# Patient Record
Sex: MALE | Race: WHITE | NOT HISPANIC OR LATINO | Employment: PART TIME | URBAN - NONMETROPOLITAN AREA
[De-identification: names, ages, dates, MRNs, and addresses within clinical notes are randomized per-mention and may not be internally consistent; named-entity substitution may affect disease eponyms.]

---

## 2018-01-22 ENCOUNTER — OFFICE VISIT (OUTPATIENT)
Dept: URGENT CARE | Facility: PHYSICIAN GROUP | Age: 63
End: 2018-01-22
Payer: COMMERCIAL

## 2018-01-22 VITALS
OXYGEN SATURATION: 96 % | HEIGHT: 64 IN | DIASTOLIC BLOOD PRESSURE: 68 MMHG | HEART RATE: 96 BPM | SYSTOLIC BLOOD PRESSURE: 124 MMHG | WEIGHT: 209 LBS | BODY MASS INDEX: 35.68 KG/M2 | RESPIRATION RATE: 16 BRPM | TEMPERATURE: 98.3 F

## 2018-01-22 DIAGNOSIS — N40.0 ENLARGED PROSTATE: Primary | ICD-10-CM

## 2018-01-22 DIAGNOSIS — E66.9 OBESITY (BMI 30-39.9): ICD-10-CM

## 2018-01-22 PROCEDURE — 99204 OFFICE O/P NEW MOD 45 MIN: CPT | Performed by: PHYSICIAN ASSISTANT

## 2018-01-22 RX ORDER — ASPIRIN 81 MG/1
81 TABLET, CHEWABLE ORAL DAILY
COMMUNITY

## 2018-01-22 RX ORDER — CHLORAL HYDRATE 500 MG
1000 CAPSULE ORAL
COMMUNITY
End: 2023-06-15

## 2018-01-22 RX ORDER — SODIUM PHOSPHATE,MONO-DIBASIC 19G-7G/118
500 ENEMA (ML) RECTAL DAILY
COMMUNITY

## 2018-01-22 RX ORDER — ATORVASTATIN CALCIUM 20 MG/1
20 TABLET, FILM COATED ORAL NIGHTLY
COMMUNITY
End: 2018-02-27 | Stop reason: SDUPTHER

## 2018-01-22 RX ORDER — TAMSULOSIN HYDROCHLORIDE 0.4 MG/1
0.4 CAPSULE ORAL
Qty: 30 CAP | Refills: 0 | Status: SHIPPED | OUTPATIENT
Start: 2018-01-22 | End: 2018-02-19 | Stop reason: SDUPTHER

## 2018-01-23 ENCOUNTER — HOSPITAL ENCOUNTER (OUTPATIENT)
Dept: LAB | Facility: MEDICAL CENTER | Age: 63
End: 2018-01-23
Attending: PHYSICIAN ASSISTANT
Payer: COMMERCIAL

## 2018-01-23 LAB
ALBUMIN SERPL BCP-MCNC: 4.6 G/DL (ref 3.2–4.9)
ALBUMIN/GLOB SERPL: 1.8 G/DL
ALP SERPL-CCNC: 38 U/L (ref 30–99)
ALT SERPL-CCNC: 40 U/L (ref 2–50)
ANION GAP SERPL CALC-SCNC: 8 MMOL/L (ref 0–11.9)
AST SERPL-CCNC: 26 U/L (ref 12–45)
BASOPHILS # BLD AUTO: 0.4 % (ref 0–1.8)
BASOPHILS # BLD: 0.02 K/UL (ref 0–0.12)
BILIRUB SERPL-MCNC: 0.9 MG/DL (ref 0.1–1.5)
BUN SERPL-MCNC: 22 MG/DL (ref 8–22)
CALCIUM SERPL-MCNC: 9 MG/DL (ref 8.5–10.5)
CHLORIDE SERPL-SCNC: 105 MMOL/L (ref 96–112)
CHOLEST SERPL-MCNC: 173 MG/DL (ref 100–199)
CO2 SERPL-SCNC: 26 MMOL/L (ref 20–33)
CREAT SERPL-MCNC: 0.76 MG/DL (ref 0.5–1.4)
EOSINOPHIL # BLD AUTO: 0.09 K/UL (ref 0–0.51)
EOSINOPHIL NFR BLD: 1.6 % (ref 0–6.9)
ERYTHROCYTE [DISTWIDTH] IN BLOOD BY AUTOMATED COUNT: 44.4 FL (ref 35.9–50)
GLOBULIN SER CALC-MCNC: 2.5 G/DL (ref 1.9–3.5)
GLUCOSE SERPL-MCNC: 94 MG/DL (ref 65–99)
HCT VFR BLD AUTO: 48.8 % (ref 42–52)
HDLC SERPL-MCNC: 40 MG/DL
HGB BLD-MCNC: 15.8 G/DL (ref 14–18)
IMM GRANULOCYTES # BLD AUTO: 0.01 K/UL (ref 0–0.11)
IMM GRANULOCYTES NFR BLD AUTO: 0.2 % (ref 0–0.9)
LDLC SERPL CALC-MCNC: 79 MG/DL
LYMPHOCYTES # BLD AUTO: 1.14 K/UL (ref 1–4.8)
LYMPHOCYTES NFR BLD: 20.5 % (ref 22–41)
MCH RBC QN AUTO: 29.6 PG (ref 27–33)
MCHC RBC AUTO-ENTMCNC: 32.4 G/DL (ref 33.7–35.3)
MCV RBC AUTO: 91.4 FL (ref 81.4–97.8)
MONOCYTES # BLD AUTO: 0.5 K/UL (ref 0–0.85)
MONOCYTES NFR BLD AUTO: 9 % (ref 0–13.4)
NEUTROPHILS # BLD AUTO: 3.79 K/UL (ref 1.82–7.42)
NEUTROPHILS NFR BLD: 68.3 % (ref 44–72)
NRBC # BLD AUTO: 0 K/UL
NRBC BLD-RTO: 0 /100 WBC
PLATELET # BLD AUTO: 231 K/UL (ref 164–446)
PMV BLD AUTO: 10.7 FL (ref 9–12.9)
POTASSIUM SERPL-SCNC: 4.1 MMOL/L (ref 3.6–5.5)
PROT SERPL-MCNC: 7.1 G/DL (ref 6–8.2)
PSA SERPL-MCNC: 0.99 NG/ML (ref 0–4)
RBC # BLD AUTO: 5.34 M/UL (ref 4.7–6.1)
SODIUM SERPL-SCNC: 139 MMOL/L (ref 135–145)
TRIGL SERPL-MCNC: 269 MG/DL (ref 0–149)
TSH SERPL DL<=0.005 MIU/L-ACNC: 1.93 UIU/ML (ref 0.38–5.33)
WBC # BLD AUTO: 5.6 K/UL (ref 4.8–10.8)

## 2018-01-23 PROCEDURE — 80061 LIPID PANEL: CPT

## 2018-01-23 PROCEDURE — 84153 ASSAY OF PSA TOTAL: CPT

## 2018-01-23 PROCEDURE — 80053 COMPREHEN METABOLIC PANEL: CPT

## 2018-01-23 PROCEDURE — 36415 COLL VENOUS BLD VENIPUNCTURE: CPT

## 2018-01-23 PROCEDURE — 84443 ASSAY THYROID STIM HORMONE: CPT

## 2018-01-23 PROCEDURE — 85025 COMPLETE CBC W/AUTO DIFF WBC: CPT

## 2018-01-23 NOTE — PROGRESS NOTES
Chief Complaint   Patient presents with   • Other     Pain/Pressure; x1 month       HISTORY OF PRESENT ILLNESS: Patient is a 62 y.o. male who presents today because he has a 4-6 week history of a fullness sensation in his perineum. No urinary symptoms, but does state that he does not urinate with the force of the teenager. No bowel or bladder changes, no bleeding rectally. He has not been taking any medication for symptoms    Patient Active Problem List    Diagnosis Date Noted   • Obesity (BMI 30-39.9) 01/22/2018       Allergies:Pcn [penicillins]    Current Outpatient Prescriptions Ordered in [facility]   Medication Sig Dispense Refill   • atorvastatin (LIPITOR) 20 MG Tab Take 20 mg by mouth every evening.     • aspirin (ASA) 81 MG Chew Tab chewable tablet Take 81 mg by mouth every day.     • glucosamine Sulfate 500 MG Cap Take 500 mg by mouth 3 times a day, with meals.     • Omega-3 Fatty Acids (FISH OIL) 1000 MG Cap capsule Take 1,000 mg by mouth 3 times a day, with meals.     • tamsulosin (FLOMAX) 0.4 MG capsule Take 1 Cap by mouth ONE-HALF HOUR AFTER BREAKFAST. 30 Cap 0     No current Epic-ordered facility-administered medications on file.        History reviewed. No pertinent past medical history.    Social History   Substance Use Topics   • Smoking status: Never Smoker   • Smokeless tobacco: Never Used   • Alcohol use Yes      Comment: 1 beer per day       No family status information on file.   History reviewed. No pertinent family history.    ROS:  Review of Systems   Constitutional: Negative for fever, chills, weight loss and malaise/fatigue.   HENT: Negative for ear pain, nosebleeds, congestion, sore throat and neck pain.    Eyes: Negative for blurred vision.   Respiratory: Negative for cough, sputum production, shortness of breath and wheezing.    Cardiovascular: Negative for chest pain, palpitations, orthopnea and leg swelling.   Gastrointestinal: Negative for heartburn, nausea, vomiting and abdominal pain.  "  Genitourinary: Negative for dysuria, urgency and frequency.     Exam:  Blood pressure 124/68, pulse 96, temperature 36.8 °C (98.3 °F), resp. rate 16, height 1.626 m (5' 4\"), weight 94.8 kg (209 lb), SpO2 96 %.  General:  Well nourished, well developed male in NAD  Head:Normocephalic, atraumatic  Eyes: PERRLA, EOM within normal limits, no conjunctival injection, no scleral icterus, visual fields and acuity grossly intact.  Extremities: no clubbing, cyanosis, or edema.  Digital rectal exam: He has a small, flesh-colored, old external hemorrhoid in the 5:00 position, no tenderness, and no induration. Prostate is smooth, enlarged but nontender.    Please note that this dictation was created using voice recognition software. I have made every reasonable attempt to correct obvious errors, but I expect that there are errors of grammar and possibly content that I did not discover before finalizing the note.    Assessment/Plan:  1. Enlarged prostate  CBC WITH DIFFERENTIAL    LIPID PANEL    TSH    PROSTATE SPECIFIC AG    COMP METABOLIC PANEL    tamsulosin (FLOMAX) 0.4 MG capsule   2. Obesity (BMI 30-39.9)  Patient identified as having weight management issue.  Appropriate orders and counseling given.       Followup with primary care in the next 7-10 days if not significantly improving, return to the urgent care or go to the emergency room sooner for any worsening of symptoms.       "

## 2018-01-29 ENCOUNTER — TELEPHONE (OUTPATIENT)
Dept: URGENT CARE | Facility: PHYSICIAN GROUP | Age: 63
End: 2018-01-29

## 2018-01-29 NOTE — TELEPHONE ENCOUNTER
----- Message from Rohit France P.A.-C. sent at 1/24/2018 12:49 PM PST -----  Please notify patient that there are no concerning abnormalities on labs.  Follow up with Dr Rose as scheduled

## 2018-02-19 DIAGNOSIS — N40.0 ENLARGED PROSTATE: ICD-10-CM

## 2018-02-19 RX ORDER — TAMSULOSIN HYDROCHLORIDE 0.4 MG/1
0.4 CAPSULE ORAL
Qty: 14 CAP | Refills: 0 | Status: SHIPPED | OUTPATIENT
Start: 2018-02-19 | End: 2018-02-27 | Stop reason: SDUPTHER

## 2018-02-27 ENCOUNTER — OFFICE VISIT (OUTPATIENT)
Dept: MEDICAL GROUP | Facility: PHYSICIAN GROUP | Age: 63
End: 2018-02-27
Payer: COMMERCIAL

## 2018-02-27 VITALS
RESPIRATION RATE: 16 BRPM | SYSTOLIC BLOOD PRESSURE: 118 MMHG | WEIGHT: 180.6 LBS | BODY MASS INDEX: 29.02 KG/M2 | TEMPERATURE: 98 F | HEART RATE: 79 BPM | OXYGEN SATURATION: 97 % | DIASTOLIC BLOOD PRESSURE: 62 MMHG | HEIGHT: 66 IN

## 2018-02-27 DIAGNOSIS — E78.5 DYSLIPIDEMIA: ICD-10-CM

## 2018-02-27 DIAGNOSIS — N40.0 BENIGN PROSTATIC HYPERPLASIA WITHOUT LOWER URINARY TRACT SYMPTOMS: ICD-10-CM

## 2018-02-27 DIAGNOSIS — M62.838 MUSCLE SPASMS OF NECK: ICD-10-CM

## 2018-02-27 DIAGNOSIS — Z96.643 HISTORY OF TOTAL REPLACEMENT OF BOTH HIP JOINTS: ICD-10-CM

## 2018-02-27 DIAGNOSIS — Z98.1 S/P CERVICAL SPINAL FUSION: ICD-10-CM

## 2018-02-27 DIAGNOSIS — M62.838 MUSCLE SPASMS OF BOTH LOWER EXTREMITIES: ICD-10-CM

## 2018-02-27 DIAGNOSIS — N40.0 ENLARGED PROSTATE: ICD-10-CM

## 2018-02-27 DIAGNOSIS — G47.33 OSA ON CPAP: ICD-10-CM

## 2018-02-27 PROCEDURE — 99204 OFFICE O/P NEW MOD 45 MIN: CPT | Performed by: FAMILY MEDICINE

## 2018-02-27 RX ORDER — TAMSULOSIN HYDROCHLORIDE 0.4 MG/1
0.4 CAPSULE ORAL
Qty: 90 CAP | Refills: 3 | Status: SHIPPED | OUTPATIENT
Start: 2018-02-27 | End: 2019-04-08 | Stop reason: SDUPTHER

## 2018-02-27 RX ORDER — TIZANIDINE 4 MG/1
4 TABLET ORAL EVERY 6 HOURS PRN
Qty: 30 TAB | Refills: 3 | Status: SHIPPED | OUTPATIENT
Start: 2018-02-27 | End: 2020-10-20

## 2018-02-27 RX ORDER — ATORVASTATIN CALCIUM 20 MG/1
20 TABLET, FILM COATED ORAL DAILY
Qty: 90 TAB | Refills: 3 | Status: SHIPPED | OUTPATIENT
Start: 2018-02-27 | End: 2019-04-08 | Stop reason: SDUPTHER

## 2018-02-27 NOTE — ASSESSMENT & PLAN NOTE
Had normal PSA screening done. Last April 2016 was 0.5  He notes he has pelvic pain which has improved with the flomax but not resolved.

## 2018-02-27 NOTE — ASSESSMENT & PLAN NOTE
He had bilateral hip replacements done in the past with revision done. He was 15 years in a WC and crutches before his hip replacements now he can walk again!!  Hereditary multiple osteochondromatosis

## 2018-02-27 NOTE — ASSESSMENT & PLAN NOTE
Continues on atorvastatin and fish oil capsules.      Ref. Range 1/23/2018 07:25   Cholesterol,Tot Latest Ref Range: 100 - 199 mg/dL 173   Triglycerides Latest Ref Range: 0 - 149 mg/dL 269 (H)   HDL Latest Ref Range: >=40 mg/dL 40   LDL Latest Ref Range: <100 mg/dL 79

## 2018-02-27 NOTE — PROGRESS NOTES
Subjective:   Amadeo Randall is a 62 y.o. male here today for evaluation and management of:     Dyslipidemia  Continues on atorvastatin and fish oil capsules.      Ref. Range 1/23/2018 07:25   Cholesterol,Tot Latest Ref Range: 100 - 199 mg/dL 173   Triglycerides Latest Ref Range: 0 - 149 mg/dL 269 (H)   HDL Latest Ref Range: >=40 mg/dL 40   LDL Latest Ref Range: <100 mg/dL 79       Benign prostatic hyperplasia without lower urinary tract symptoms  Had normal PSA screening done. Last April 2016 was 0.5  He notes he has pelvic pain which has improved with the flomax but not resolved.     S/P cervical spinal fusion  C 5-6-7 fusion done Nov 2014  Pain aggravated by stress.   He was on valium and uses 60 pills for the year. He was receiving this from this orthopedic specialist. Advised him to avoid benzos.   Flexeril gives him a hangover.   Will try tizanidine.     History of total replacement of both hip joints  He had bilateral hip replacements done in the past with revision done. He was 15 years in a WC and crutches before his hip replacements now he can walk again!!  Hereditary multiple osteochondromatosis     He had colonoscopies done 2007 and 2014: gets it done every 5 years to follow polyps.     Current medicines (including changes today)  Current Outpatient Prescriptions   Medication Sig Dispense Refill   • tizanidine (ZANAFLEX) 4 MG Tab Take 1 Tab by mouth every 6 hours as needed. 30 Tab 3   • tamsulosin (FLOMAX) 0.4 MG capsule Take 1 Cap by mouth ONE-HALF HOUR AFTER BREAKFAST. 14 Cap 0   • atorvastatin (LIPITOR) 20 MG Tab Take 20 mg by mouth every evening.     • aspirin (ASA) 81 MG Chew Tab chewable tablet Take 81 mg by mouth every day.     • glucosamine Sulfate 500 MG Cap Take 500 mg by mouth every day.     • Omega-3 Fatty Acids (FISH OIL) 1000 MG Cap capsule Take 1,000 mg by mouth 3 times a day, with meals.       No current facility-administered medications for this visit.      He  has no past medical history on  "file.    ROS  No chest pain, no shortness of breath, no abdominal pain       Objective:     Blood pressure 118/62, pulse 79, temperature 36.7 °C (98 °F), resp. rate 16, height 1.67 m (5' 5.75\"), weight 81.9 kg (180 lb 9.6 oz), SpO2 97 %. Body mass index is 29.37 kg/m².   Physical Exam:  Constitutional: Alert, no distress.  Skin: Warm, dry, good turgor, no rashes in visible areas.  Eye: Equal, round and reactive, conjunctiva clear, lids normal.  ENMT: Lips without lesions, good dentition, oropharynx clear.  Neck: Trachea midline, no masses, no thyromegaly. No cervical or supraclavicular lymphadenopathy  Respiratory: Unlabored respiratory effort, lungs clear to auscultation, no wheezes, no ronchi.  Cardiovascular: Normal S1, S2, no murmur, no edema.  Abdomen: Soft, non-tender, no masses, no hepatosplenomegaly.  Psych: Alert and oriented x3, normal affect and mood.        Assessment and Plan:   The following treatment plan was discussed    1. Dyslipidemia  Controlled with atorvastatin, fish oil capsules    2. Benign prostatic hyperplasia without lower urinary tract symptoms  He takes flomax,   Will refer to urology.     3. S/P cervical spinal fusion  Stable.     4. Muscle spasms of both lower extremities  Advised to avoid valium if possible, will try tizanidine, if this does not help with follow controlled substance agreement, check UDS and provide rx for valium.     5. Muscle spasms of neck  - tizanidine (ZANAFLEX) 4 MG Tab; Take 1 Tab by mouth every 6 hours as needed.  Dispense: 30 Tab; Refill: 3    6. History of total replacement of both hip joints  Stable.       Followup: No Follow-up on file.         "

## 2018-02-27 NOTE — ASSESSMENT & PLAN NOTE
C 5-6-7 fusion done Nov 2014  Pain aggravated by stress.   He was on valium and uses 60 pills for the year. He was receiving this from this orthopedic specialist. Advised him to avoid benzos.   Flexeril gives him a hangover.   Will try tizanidine.

## 2018-02-27 NOTE — ASSESSMENT & PLAN NOTE
Has been using cpap without oxygen for 4 years for ciro  At setting 8.0  He was seen by a sleep specialist last year and all was stable.

## 2018-03-02 ENCOUNTER — TELEPHONE (OUTPATIENT)
Dept: MEDICAL GROUP | Facility: PHYSICIAN GROUP | Age: 63
End: 2018-03-02

## 2018-03-05 DIAGNOSIS — N40.1 BENIGN PROSTATIC HYPERPLASIA WITH LOWER URINARY TRACT SYMPTOMS, SYMPTOM DETAILS UNSPECIFIED: ICD-10-CM

## 2018-06-11 ENCOUNTER — OFFICE VISIT (OUTPATIENT)
Dept: MEDICAL GROUP | Facility: PHYSICIAN GROUP | Age: 63
End: 2018-06-11
Payer: COMMERCIAL

## 2018-06-11 VITALS
OXYGEN SATURATION: 98 % | HEART RATE: 81 BPM | WEIGHT: 186 LBS | BODY MASS INDEX: 31.76 KG/M2 | SYSTOLIC BLOOD PRESSURE: 116 MMHG | TEMPERATURE: 97.9 F | RESPIRATION RATE: 16 BRPM | DIASTOLIC BLOOD PRESSURE: 66 MMHG | HEIGHT: 64 IN

## 2018-06-11 DIAGNOSIS — E78.5 DYSLIPIDEMIA: ICD-10-CM

## 2018-06-11 DIAGNOSIS — L98.9 SKIN LESION: ICD-10-CM

## 2018-06-11 DIAGNOSIS — N40.0 BENIGN PROSTATIC HYPERPLASIA WITHOUT LOWER URINARY TRACT SYMPTOMS: ICD-10-CM

## 2018-06-11 PROCEDURE — 17000 DESTRUCT PREMALG LESION: CPT | Performed by: FAMILY MEDICINE

## 2018-06-11 PROCEDURE — 99213 OFFICE O/P EST LOW 20 MIN: CPT | Mod: 25 | Performed by: FAMILY MEDICINE

## 2018-06-11 NOTE — PROGRESS NOTES
"Subjective:   Amadeo Randall is a 62 y.o. male here today for evaluation and management of:     Skin lesion  Right forearm  Small skin lesion  0.5 mm oval, hyperpigmented.   Will treat with cryotherapy as lesion has been changing.     Dyslipidemia  Elevated TG  Advised on diet changes.     Benign prostatic hyperplasia without lower urinary tract symptoms  psa is normal,   Check yearly.          Current medicines (including changes today)  Current Outpatient Prescriptions   Medication Sig Dispense Refill   • tizanidine (ZANAFLEX) 4 MG Tab Take 1 Tab by mouth every 6 hours as needed. 30 Tab 3   • tamsulosin (FLOMAX) 0.4 MG capsule Take 1 Cap by mouth ONE-HALF HOUR AFTER BREAKFAST. 90 Cap 3   • atorvastatin (LIPITOR) 20 MG Tab Take 1 Tab by mouth every day. 90 Tab 3   • aspirin (ASA) 81 MG Chew Tab chewable tablet Take 81 mg by mouth every day.     • glucosamine Sulfate 500 MG Cap Take 500 mg by mouth every day.     • Omega-3 Fatty Acids (FISH OIL) 1000 MG Cap capsule Take 1,000 mg by mouth 3 times a day, with meals.       No current facility-administered medications for this visit.      He  has no past medical history on file.    ROS  No chest pain, no shortness of breath, no abdominal pain       Objective:     Blood pressure 116/66, pulse 81, temperature 36.6 °C (97.9 °F), resp. rate 16, height 1.619 m (5' 3.75\"), weight 84.4 kg (186 lb), SpO2 98 %. Body mass index is 32.18 kg/m².   Physical Exam:  Constitutional: Alert, no distress.  Skin: Warm, dry, good turgor,0.5 mm lesion on right fore arm. Raised, evenly pigmented oval.   Eye: Equal, round and reactive, conjunctiva clear, lids normal.  ENMT: Lips without lesions, good dentition, oropharynx clear.  Neck: Trachea midline, no masses, no thyromegaly. No cervical or supraclavicular lymphadenopathy  Respiratory: Unlabored respiratory effort, lungs clear to auscultation, no wheezes, no ronchi.  Cardiovascular: Normal S1, S2, no murmur, no edema.  Abdomen: Soft, " non-tender, no masses, no hepatosplenomegaly.  Psych: Alert and oriented x3, normal affect and mood.        Assessment and Plan:   The following treatment plan was discussed    1. Skin lesion  Treated with cryotherapy  - REFERRAL TO DERMATOLOGY    2. Dyslipidemia  Encouraged diet changes  Continue on atorvastatin.     3. Benign prostatic hyperplasia without lower urinary tract symptoms  Normal PSA  Continue follow up with urology.       Followup: Return in about 6 months (around 12/11/2018).

## 2018-06-11 NOTE — ASSESSMENT & PLAN NOTE
Right forearm  Small skin lesion  0.5 mm oval, hyperpigmented.   Will treat with cryotherapy as lesion has been changing.

## 2018-12-23 ENCOUNTER — OFFICE VISIT (OUTPATIENT)
Dept: URGENT CARE | Facility: PHYSICIAN GROUP | Age: 63
End: 2018-12-23
Payer: COMMERCIAL

## 2018-12-23 VITALS
BODY MASS INDEX: 31.66 KG/M2 | WEIGHT: 183 LBS | TEMPERATURE: 97.7 F | RESPIRATION RATE: 16 BRPM | DIASTOLIC BLOOD PRESSURE: 78 MMHG | OXYGEN SATURATION: 93 % | SYSTOLIC BLOOD PRESSURE: 128 MMHG | HEART RATE: 68 BPM

## 2018-12-23 DIAGNOSIS — R05.9 COUGH: ICD-10-CM

## 2018-12-23 DIAGNOSIS — J22 LRTI (LOWER RESPIRATORY TRACT INFECTION): ICD-10-CM

## 2018-12-23 PROCEDURE — 99214 OFFICE O/P EST MOD 30 MIN: CPT | Performed by: NURSE PRACTITIONER

## 2018-12-23 RX ORDER — AZITHROMYCIN 250 MG/1
TABLET, FILM COATED ORAL
Qty: 6 TAB | Refills: 0 | Status: SHIPPED | OUTPATIENT
Start: 2018-12-23 | End: 2019-07-10

## 2018-12-23 RX ORDER — DEXTROMETHORPHAN HYDROBROMIDE AND PROMETHAZINE HYDROCHLORIDE 15; 6.25 MG/5ML; MG/5ML
5 SYRUP ORAL EVERY 4 HOURS PRN
Qty: 120 ML | Refills: 0 | Status: SHIPPED | OUTPATIENT
Start: 2018-12-23 | End: 2019-07-10

## 2018-12-23 ASSESSMENT — ENCOUNTER SYMPTOMS
EYE PAIN: 0
COUGH: 1
SORE THROAT: 0
NAUSEA: 0
MYALGIAS: 0
DIZZINESS: 0
SPUTUM PRODUCTION: 1
CHILLS: 1
WHEEZING: 0
SHORTNESS OF BREATH: 0
VOMITING: 0
FEVER: 0

## 2018-12-23 NOTE — PROGRESS NOTES
Subjective:   Amadeo Randall is a 63 y.o. male who presents for Cough        Cough   This is a new problem. The current episode started in the past 7 days. The problem has been gradually worsening. The problem occurs constantly. The cough is productive of sputum. Associated symptoms include chills and nasal congestion. Pertinent negatives include no chest pain, fever, myalgias, postnasal drip, rash, sore throat, shortness of breath or wheezing. Nothing aggravates the symptoms. He has tried OTC cough suppressant for the symptoms. The treatment provided no relief. There is no history of bronchitis.     Review of Systems   Constitutional: Positive for chills and malaise/fatigue. Negative for fever.   HENT: Positive for congestion. Negative for postnasal drip and sore throat.    Eyes: Negative for pain.   Respiratory: Positive for cough and sputum production. Negative for shortness of breath and wheezing.    Cardiovascular: Negative for chest pain.   Gastrointestinal: Negative for nausea and vomiting.   Genitourinary: Negative for hematuria.   Musculoskeletal: Negative for myalgias.   Skin: Negative for rash.   Neurological: Negative for dizziness.     Allergies   Allergen Reactions   • Pcn [Penicillins] Rash     Rash      Objective:   /78   Pulse 68   Temp 36.5 °C (97.7 °F) (Temporal)   Resp 16   Wt 83 kg (183 lb)   SpO2 93%   BMI 31.66 kg/m²   Physical Exam   Constitutional: He is oriented to person, place, and time. He appears well-developed and well-nourished. No distress.   HENT:   Head: Normocephalic and atraumatic.   Right Ear: Tympanic membrane normal.   Left Ear: Tympanic membrane normal.   Nose: Nose normal. Right sinus exhibits no maxillary sinus tenderness and no frontal sinus tenderness. Left sinus exhibits no maxillary sinus tenderness and no frontal sinus tenderness.   Mouth/Throat: Uvula is midline, oropharynx is clear and moist and mucous membranes are normal. No posterior oropharyngeal edema,  posterior oropharyngeal erythema or tonsillar abscesses. No tonsillar exudate.   Eyes: Pupils are equal, round, and reactive to light. Conjunctivae and EOM are normal. Right eye exhibits no discharge. Left eye exhibits no discharge.   Cardiovascular: Normal rate and regular rhythm.    No murmur heard.  Pulmonary/Chest: Effort normal and breath sounds normal. No respiratory distress.   Abdominal: Soft. He exhibits no distension. There is no tenderness.   Lymphadenopathy:     He has no cervical adenopathy.   Neurological: He is alert and oriented to person, place, and time. He has normal reflexes. No sensory deficit.   Skin: Skin is warm, dry and intact.   Psychiatric: He has a normal mood and affect.         Assessment/Plan:   1. LRTI (lower respiratory tract infection)  - azithromycin (ZITHROMAX) 250 MG Tab; Take 2 tablets by mouth on day one. Take one tablet by mouth the remaining days until gone  Dispense: 6 Tab; Refill: 0    2. Cough  - promethazine-dextromethorphan (PROMETHAZINE-DM) 6.25-15 MG/5ML syrup; Take 5 mL by mouth every four hours as needed for Cough.  Dispense: 120 mL; Refill: 0  Advised to continue supportive care with Tylenol and/or ibuprofen for fevers and discomfort. Increased fluids and electrolytes.    Differential diagnosis, natural history, supportive care, and indications for immediate follow-up discussed.

## 2019-01-03 ENCOUNTER — APPOINTMENT (OUTPATIENT)
Dept: RADIOLOGY | Facility: IMAGING CENTER | Age: 64
End: 2019-01-03
Attending: CHIROPRACTOR
Payer: COMMERCIAL

## 2019-01-03 ENCOUNTER — NON-PROVIDER VISIT (OUTPATIENT)
Dept: URGENT CARE | Facility: PHYSICIAN GROUP | Age: 64
End: 2019-01-03
Payer: COMMERCIAL

## 2019-01-03 DIAGNOSIS — M54.5 LOW BACK PAIN, UNSPECIFIED BACK PAIN LATERALITY, UNSPECIFIED CHRONICITY, WITH SCIATICA PRESENCE UNSPECIFIED: ICD-10-CM

## 2019-01-03 PROCEDURE — 72100 X-RAY EXAM L-S SPINE 2/3 VWS: CPT | Mod: TC,FY | Performed by: EMERGENCY MEDICINE

## 2019-01-14 ENCOUNTER — NON-PROVIDER VISIT (OUTPATIENT)
Dept: URGENT CARE | Facility: PHYSICIAN GROUP | Age: 64
End: 2019-01-14
Payer: COMMERCIAL

## 2019-01-14 ENCOUNTER — APPOINTMENT (OUTPATIENT)
Dept: RADIOLOGY | Facility: IMAGING CENTER | Age: 64
End: 2019-01-14
Attending: CHIROPRACTOR
Payer: COMMERCIAL

## 2019-01-14 DIAGNOSIS — M79.643 PAIN OF HAND, UNSPECIFIED LATERALITY: ICD-10-CM

## 2019-01-14 DIAGNOSIS — M19.031 ARTHRITIS OF RIGHT WRIST: ICD-10-CM

## 2019-01-14 DIAGNOSIS — M79.642 PAIN OF LEFT HAND: ICD-10-CM

## 2019-01-14 PROCEDURE — 73130 X-RAY EXAM OF HAND: CPT | Mod: TC,FY,LT | Performed by: FAMILY MEDICINE

## 2019-01-14 PROCEDURE — 73110 X-RAY EXAM OF WRIST: CPT | Mod: TC,FY,LT | Performed by: FAMILY MEDICINE

## 2019-01-26 ENCOUNTER — APPOINTMENT (OUTPATIENT)
Dept: URGENT CARE | Facility: PHYSICIAN GROUP | Age: 64
End: 2019-01-26
Payer: COMMERCIAL

## 2019-01-26 ENCOUNTER — APPOINTMENT (OUTPATIENT)
Dept: RADIOLOGY | Facility: IMAGING CENTER | Age: 64
End: 2019-01-26
Attending: NEUROLOGICAL SURGERY
Payer: COMMERCIAL

## 2019-01-26 DIAGNOSIS — M54.2 CERVICALGIA: ICD-10-CM

## 2019-01-26 PROCEDURE — 72050 X-RAY EXAM NECK SPINE 4/5VWS: CPT | Mod: 26 | Performed by: FAMILY MEDICINE

## 2019-02-14 ENCOUNTER — HOSPITAL ENCOUNTER (OUTPATIENT)
Dept: RADIOLOGY | Facility: MEDICAL CENTER | Age: 64
End: 2019-02-14
Attending: NEUROLOGICAL SURGERY
Payer: COMMERCIAL

## 2019-02-14 DIAGNOSIS — M54.2 NECK PAIN: ICD-10-CM

## 2019-02-14 PROCEDURE — 72141 MRI NECK SPINE W/O DYE: CPT

## 2019-04-08 DIAGNOSIS — N40.0 ENLARGED PROSTATE: ICD-10-CM

## 2019-04-09 NOTE — TELEPHONE ENCOUNTER
Was the patient seen in the last year in this department? Yes    Does patient have an active prescription for medications requested? No     Received Request Via: Pharmacy      Pt met protocol?: Yes, last ov 6/18  No recent labs     Lab Results  Component Value Date/Time   CHOLSTRLTOT 173 01/23/2018 0725   TRIGLYCERIDE 269 (H) 01/23/2018 0725   HDL 40 01/23/2018 0725   LDL 79 01/23/2018 0725

## 2019-04-10 RX ORDER — TAMSULOSIN HYDROCHLORIDE 0.4 MG/1
CAPSULE ORAL
Qty: 90 CAP | Refills: 0 | Status: SHIPPED | OUTPATIENT
Start: 2019-04-10 | End: 2019-07-08 | Stop reason: SDUPTHER

## 2019-04-10 RX ORDER — ATORVASTATIN CALCIUM 20 MG/1
TABLET, FILM COATED ORAL
Qty: 90 TAB | Refills: 0 | Status: SHIPPED | OUTPATIENT
Start: 2019-04-10 | End: 2019-07-08 | Stop reason: SDUPTHER

## 2019-06-11 ENCOUNTER — TELEPHONE (OUTPATIENT)
Dept: MEDICAL GROUP | Facility: PHYSICIAN GROUP | Age: 64
End: 2019-06-11

## 2019-06-12 DIAGNOSIS — Z12.5 SCREENING FOR MALIGNANT NEOPLASM OF PROSTATE: ICD-10-CM

## 2019-06-12 DIAGNOSIS — E78.5 DYSLIPIDEMIA: ICD-10-CM

## 2019-06-12 NOTE — TELEPHONE ENCOUNTER
Phone Number Called: [phone]    Call outcome: spoke to patient regarding message below    Message: Please let patient know fasting labs ordered.   Don Roes M.D. (Routing comment)

## 2019-06-12 NOTE — TELEPHONE ENCOUNTER
Patient has an appointment this month and would like to know if he needs labs done prior to the appt

## 2019-06-15 ENCOUNTER — HOSPITAL ENCOUNTER (OUTPATIENT)
Dept: LAB | Facility: MEDICAL CENTER | Age: 64
End: 2019-06-15
Attending: FAMILY MEDICINE
Payer: COMMERCIAL

## 2019-06-15 DIAGNOSIS — Z12.5 SCREENING FOR MALIGNANT NEOPLASM OF PROSTATE: ICD-10-CM

## 2019-06-15 DIAGNOSIS — E78.5 DYSLIPIDEMIA: ICD-10-CM

## 2019-06-15 LAB
ALBUMIN SERPL BCP-MCNC: 4.4 G/DL (ref 3.2–4.9)
ALBUMIN/GLOB SERPL: 1.8 G/DL
ALP SERPL-CCNC: 42 U/L (ref 30–99)
ALT SERPL-CCNC: 37 U/L (ref 2–50)
ANION GAP SERPL CALC-SCNC: 10 MMOL/L (ref 0–11.9)
AST SERPL-CCNC: 26 U/L (ref 12–45)
BILIRUB SERPL-MCNC: 1.4 MG/DL (ref 0.1–1.5)
BUN SERPL-MCNC: 19 MG/DL (ref 8–22)
CALCIUM SERPL-MCNC: 9.1 MG/DL (ref 8.5–10.5)
CHLORIDE SERPL-SCNC: 107 MMOL/L (ref 96–112)
CHOLEST SERPL-MCNC: 135 MG/DL (ref 100–199)
CO2 SERPL-SCNC: 25 MMOL/L (ref 20–33)
CREAT SERPL-MCNC: 0.83 MG/DL (ref 0.5–1.4)
FASTING STATUS PATIENT QL REPORTED: NORMAL
GLOBULIN SER CALC-MCNC: 2.4 G/DL (ref 1.9–3.5)
GLUCOSE SERPL-MCNC: 103 MG/DL (ref 65–99)
HDLC SERPL-MCNC: 46 MG/DL
LDLC SERPL CALC-MCNC: 71 MG/DL
POTASSIUM SERPL-SCNC: 4.1 MMOL/L (ref 3.6–5.5)
PROT SERPL-MCNC: 6.8 G/DL (ref 6–8.2)
PSA SERPL-MCNC: 0.57 NG/ML (ref 0–4)
SODIUM SERPL-SCNC: 142 MMOL/L (ref 135–145)
TRIGL SERPL-MCNC: 89 MG/DL (ref 0–149)

## 2019-06-15 PROCEDURE — 36415 COLL VENOUS BLD VENIPUNCTURE: CPT

## 2019-06-15 PROCEDURE — 80061 LIPID PANEL: CPT

## 2019-06-15 PROCEDURE — 80053 COMPREHEN METABOLIC PANEL: CPT

## 2019-06-15 PROCEDURE — 84153 ASSAY OF PSA TOTAL: CPT

## 2019-07-08 DIAGNOSIS — N40.0 ENLARGED PROSTATE: ICD-10-CM

## 2019-07-10 ENCOUNTER — OFFICE VISIT (OUTPATIENT)
Dept: MEDICAL GROUP | Facility: PHYSICIAN GROUP | Age: 64
End: 2019-07-10
Payer: COMMERCIAL

## 2019-07-10 ENCOUNTER — TELEPHONE (OUTPATIENT)
Dept: MEDICAL GROUP | Facility: PHYSICIAN GROUP | Age: 64
End: 2019-07-10

## 2019-07-10 VITALS
DIASTOLIC BLOOD PRESSURE: 76 MMHG | RESPIRATION RATE: 14 BRPM | TEMPERATURE: 98 F | SYSTOLIC BLOOD PRESSURE: 126 MMHG | WEIGHT: 182 LBS | HEART RATE: 82 BPM | HEIGHT: 66 IN | BODY MASS INDEX: 29.25 KG/M2 | OXYGEN SATURATION: 94 %

## 2019-07-10 DIAGNOSIS — L98.9 SKIN LESION: ICD-10-CM

## 2019-07-10 DIAGNOSIS — N40.0 BENIGN PROSTATIC HYPERPLASIA WITHOUT LOWER URINARY TRACT SYMPTOMS: ICD-10-CM

## 2019-07-10 DIAGNOSIS — E78.5 DYSLIPIDEMIA: ICD-10-CM

## 2019-07-10 DIAGNOSIS — Z11.59 ENCOUNTER FOR HEPATITIS C SCREENING TEST FOR LOW RISK PATIENT: ICD-10-CM

## 2019-07-10 PROCEDURE — 99214 OFFICE O/P EST MOD 30 MIN: CPT | Performed by: FAMILY MEDICINE

## 2019-07-10 RX ORDER — TAMSULOSIN HYDROCHLORIDE 0.4 MG/1
CAPSULE ORAL
Qty: 90 CAP | Refills: 1 | Status: SHIPPED | OUTPATIENT
Start: 2019-07-10 | End: 2020-01-07

## 2019-07-10 RX ORDER — SPIRONOLACT/HYDROCHLOROTHIAZID 25 MG-25MG
TABLET ORAL
COMMUNITY

## 2019-07-10 RX ORDER — BACITRACIN 500 [USP'U]/G
OINTMENT OPHTHALMIC
COMMUNITY
Start: 2019-05-16 | End: 2020-10-20

## 2019-07-10 RX ORDER — ATORVASTATIN CALCIUM 20 MG/1
TABLET, FILM COATED ORAL
Qty: 90 TAB | Refills: 3 | Status: SHIPPED | OUTPATIENT
Start: 2019-07-10 | End: 2020-07-07

## 2019-07-10 ASSESSMENT — PATIENT HEALTH QUESTIONNAIRE - PHQ9: CLINICAL INTERPRETATION OF PHQ2 SCORE: 0

## 2019-07-10 NOTE — ASSESSMENT & PLAN NOTE
Chronic condition well-controlled patient is on omega-3 fish oil 1000 mg 3 times a day atorvastatin 20 mg daily tolerates with no myalgia.  LFTs are normal.  Results for KAYCE BARBOSA (MRN [id number]) as of 7/10/2019 15:42   Ref. Range 6/15/2019 09:50   Cholesterol,Tot Latest Ref Range: 100 - 199 mg/dL 135   Triglycerides Latest Ref Range: 0 - 149 mg/dL 89   HDL Latest Ref Range: >=40 mg/dL 46   LDL Latest Ref Range: <100 mg/dL 71

## 2019-07-10 NOTE — TELEPHONE ENCOUNTER
I don't see CT head report which pt remembers he had done around feb 2019  Can we call radiology and see if they can upload it to epic thank you.

## 2019-07-10 NOTE — TELEPHONE ENCOUNTER
Pt has had OV within the 12 month protocol and lipid panel is current. 12 month supply sent to pharmacy on chol med and 6 months on Flomax.   Lab Results   Component Value Date/Time    CHOLSTRLTOT 135 06/15/2019 09:50 AM    LDL 71 06/15/2019 09:50 AM    HDL 46 06/15/2019 09:50 AM    TRIGLYCERIDE 89 06/15/2019 09:50 AM       Lab Results   Component Value Date/Time    SODIUM 142 06/15/2019 09:50 AM    POTASSIUM 4.1 06/15/2019 09:50 AM    CHLORIDE 107 06/15/2019 09:50 AM    CO2 25 06/15/2019 09:50 AM    GLUCOSE 103 (H) 06/15/2019 09:50 AM    BUN 19 06/15/2019 09:50 AM    CREATININE 0.83 06/15/2019 09:50 AM     Lab Results   Component Value Date/Time    ALKPHOSPHAT 42 06/15/2019 09:50 AM    ASTSGOT 26 06/15/2019 09:50 AM    ALTSGPT 37 06/15/2019 09:50 AM    TBILIRUBIN 1.4 06/15/2019 09:50 AM

## 2019-07-10 NOTE — TELEPHONE ENCOUNTER
Was the patient seen in the last year in this department? No     Does patient have an active prescription for medications requested? No     Received Request Via: Pharmacy    Pt met protocol?: No     Last OV 06/11/18    BP Readings from Last 1 Encounters:   12/23/18 128/78

## 2019-07-10 NOTE — PROGRESS NOTES
Subjective:   Kayce Randall is a 63 y.o. male here today for evaluation and management of:     Skin lesion  Actinic keratoses 2 on scalp 2 on left shoulder one on left leg one on right leg cryotherapy done today monitor closely for any changes.    Dyslipidemia  Chronic condition well-controlled patient is on omega-3 fish oil 1000 mg 3 times a day atorvastatin 20 mg daily tolerates with no myalgia.  LFTs are normal.  Results for KAYCE RANDALL (MRN [id number]) as of 7/10/2019 15:42   Ref. Range 6/15/2019 09:50   Cholesterol,Tot Latest Ref Range: 100 - 199 mg/dL 135   Triglycerides Latest Ref Range: 0 - 149 mg/dL 89   HDL Latest Ref Range: >=40 mg/dL 46   LDL Latest Ref Range: <100 mg/dL 71       Benign prostatic hyperplasia without lower urinary tract symptoms  PSA normal range patient is on tamsulosin.     Patient had evaluation by neurosurgeon for some unusual sounds in his head while walking.  This is an ongoing problem.  Patient has no other neurological deficits.  He has had surgery done on his spine in the past MR of cervical spine revealed no etiology for this problem.  He did have a head CT done in February that was ordered by his neurosurgeon.  I will get report of this from radiology.  I will contact patient with the results.  If head CT is normal, we could try evaluating with audiology for hearing changes.  Any changes in the patient's symptoms would be good to get probably further imaging like a repeat CT or MRI of the head in the future.    Current medicines (including changes today)  Current Outpatient Prescriptions   Medication Sig Dispense Refill   • Coenzyme Q10 (CO Q-10) 300 MG Cap Take  by mouth.     • aspirin (ASA) 81 MG Chew Tab chewable tablet Take 81 mg by mouth every day.     • glucosamine Sulfate 500 MG Cap Take 500 mg by mouth every day.     • Omega-3 Fatty Acids (FISH OIL) 1000 MG Cap capsule Take 1,000 mg by mouth 3 times a day, with meals.     • tamsulosin (FLOMAX) 0.4 MG capsule TAKE 1 CAPSULE  "1/2 HOUR AFTER BREAKFAST 90 Cap 1   • atorvastatin (LIPITOR) 20 MG Tab TAKE 1 TABLET DAILY 90 Tab 3   • bacitracin 500 UNIT/GM Ointment      • tizanidine (ZANAFLEX) 4 MG Tab Take 1 Tab by mouth every 6 hours as needed. 30 Tab 3     No current facility-administered medications for this visit.      He  has no past medical history on file.    ROS  No chest pain, no shortness of breath, no abdominal pain       Objective:     /76 (BP Location: Left arm, Patient Position: Sitting, BP Cuff Size: Adult)   Pulse 82   Temp 36.7 °C (98 °F) (Temporal)   Resp 14   Ht 1.664 m (5' 5.5\")   Wt 82.6 kg (182 lb)   SpO2 94%  Body mass index is 29.83 kg/m².   Physical Exam:  Constitutional: Alert, no distress.  Skin: Warm, dry, good turgor, no rashes in visible areas.  Eye: Equal, round and reactive, conjunctiva clear, lids normal.  ENMT: Lips without lesions, good dentition, oropharynx clear.  Neck: Trachea midline, no masses, no thyromegaly. No cervical or supraclavicular lymphadenopathy  Respiratory: Unlabored respiratory effort, lungs clear to auscultation, no wheezes, no ronchi.  Cardiovascular: Normal S1, S2, no murmur, no edema.  Abdomen: Soft, non-tender, no masses, no hepatosplenomegaly.  Psych: Alert and oriented x3, normal affect and mood.        Assessment and Plan:   The following treatment plan was discussed    1. Skin lesion  Cryotherapy done     2. Dyslipidemia  Well controlled.   - Comp Metabolic Panel; Future  - Lipid Profile; Future    3. Benign prostatic hyperplasia without lower urinary tract symptoms  Stable. Monitor PSA  - PROSTATE SPECIFIC AG SCREENING; Future    4. Encounter for hepatitis C screening test for low risk patient  - HEP C VIRUS ANTIBODY; Future      Followup: Return in about 1 year (around 7/10/2020) for review labs.         "

## 2019-07-10 NOTE — ASSESSMENT & PLAN NOTE
Actinic keratoses 2 on scalp 2 on left shoulder one on left leg one on right leg cryotherapy done today monitor closely for any changes.

## 2019-07-11 NOTE — TELEPHONE ENCOUNTER
Patient had CT head done at [facility]. Called [facility] and they have a CT brain that they will push over to Epic. Thanks

## 2019-07-16 ENCOUNTER — HOSPITAL ENCOUNTER (OUTPATIENT)
Dept: RADIOLOGY | Facility: MEDICAL CENTER | Age: 64
End: 2019-07-16

## 2019-11-18 ENCOUNTER — OFFICE VISIT (OUTPATIENT)
Dept: URGENT CARE | Facility: PHYSICIAN GROUP | Age: 64
End: 2019-11-18
Payer: COMMERCIAL

## 2019-11-18 VITALS
SYSTOLIC BLOOD PRESSURE: 130 MMHG | TEMPERATURE: 97.9 F | HEART RATE: 92 BPM | RESPIRATION RATE: 14 BRPM | DIASTOLIC BLOOD PRESSURE: 72 MMHG | WEIGHT: 181 LBS | OXYGEN SATURATION: 94 % | BODY MASS INDEX: 29.66 KG/M2

## 2019-11-18 DIAGNOSIS — J02.9 PHARYNGITIS, UNSPECIFIED ETIOLOGY: ICD-10-CM

## 2019-11-18 LAB
INT CON NEG: NORMAL
INT CON POS: NORMAL
S PYO AG THROAT QL: NEGATIVE

## 2019-11-18 PROCEDURE — 99214 OFFICE O/P EST MOD 30 MIN: CPT | Performed by: PHYSICIAN ASSISTANT

## 2019-11-18 PROCEDURE — 87880 STREP A ASSAY W/OPTIC: CPT | Performed by: PHYSICIAN ASSISTANT

## 2019-11-18 RX ORDER — AZITHROMYCIN 250 MG/1
TABLET, FILM COATED ORAL
Qty: 6 TAB | Refills: 0 | Status: SHIPPED | OUTPATIENT
Start: 2019-11-18 | End: 2020-10-20

## 2019-11-18 ASSESSMENT — ENCOUNTER SYMPTOMS
COUGH: 1
SORE THROAT: 1
FEVER: 0
CHILLS: 0

## 2019-11-18 NOTE — PROGRESS NOTES
Subjective:   Amadeo Randall is a 64 y.o. male who presents today with   Chief Complaint   Patient presents with   • Sore Throat     x4d   • Cough       Pharyngitis    This is a new problem. Episode onset: 4 days. The problem has been unchanged. There has been no fever. The patient is experiencing no pain. Associated symptoms include congestion and coughing. He has tried nothing for the symptoms. The treatment provided no relief.   Green mucous from nose today  PMH:  has no past medical history on file.  MEDS:   Current Outpatient Medications:   •  azithromycin (ZITHROMAX) 250 MG Tab, Take 2 tablets by mouth on day one. Take one tablet by mouth the remaining days until gone, Disp: 6 Tab, Rfl: 0  •  tamsulosin (FLOMAX) 0.4 MG capsule, TAKE 1 CAPSULE 1/2 HOUR AFTER BREAKFAST, Disp: 90 Cap, Rfl: 1  •  atorvastatin (LIPITOR) 20 MG Tab, TAKE 1 TABLET DAILY, Disp: 90 Tab, Rfl: 3  •  bacitracin 500 UNIT/GM Ointment, , Disp: , Rfl:   •  Coenzyme Q10 (CO Q-10) 300 MG Cap, Take  by mouth., Disp: , Rfl:   •  tizanidine (ZANAFLEX) 4 MG Tab, Take 1 Tab by mouth every 6 hours as needed., Disp: 30 Tab, Rfl: 3  •  aspirin (ASA) 81 MG Chew Tab chewable tablet, Take 81 mg by mouth every day., Disp: , Rfl:   •  glucosamine Sulfate 500 MG Cap, Take 500 mg by mouth every day., Disp: , Rfl:   •  Omega-3 Fatty Acids (FISH OIL) 1000 MG Cap capsule, Take 1,000 mg by mouth 3 times a day, with meals., Disp: , Rfl:   ALLERGIES:   Allergies   Allergen Reactions   • Pcn [Penicillins] Rash     Rash     SURGHX: History reviewed. No pertinent surgical history.  SOCHX:  reports that he has never smoked. He has never used smokeless tobacco. He reports current alcohol use. He reports that he does not use drugs.  FH: Reviewed with patient, not pertinent to this visit.       Review of Systems   Constitutional: Negative for chills and fever.   HENT: Positive for congestion and sore throat.    Respiratory: Positive for cough.    All other systems reviewed  and are negative.       Objective:   /72   Pulse 92   Temp 36.6 °C (97.9 °F) (Temporal)   Resp 14   Wt 82.1 kg (181 lb)   SpO2 94%   BMI 29.66 kg/m²   Physical Exam  Vitals signs and nursing note reviewed.   Constitutional:       General: He is not in acute distress.     Appearance: He is well-developed.   HENT:      Head: Normocephalic and atraumatic.      Right Ear: Hearing normal.      Left Ear: Hearing normal.   Eyes:      Pupils: Pupils are equal, round, and reactive to light.   Cardiovascular:      Rate and Rhythm: Normal rate and regular rhythm.      Heart sounds: Normal heart sounds.   Pulmonary:      Effort: Pulmonary effort is normal.   Musculoskeletal:      Comments: Normal movement in all 4 extremities   Skin:     General: Skin is warm and dry.   Neurological:      Mental Status: He is alert.      Coordination: Coordination normal.   Psychiatric:         Mood and Affect: Mood normal.       STREP A NEG  Assessment/Plan:   Assessment    1. Pharyngitis, unspecified etiology  - azithromycin (ZITHROMAX) 250 MG Tab; Take 2 tablets by mouth on day one. Take one tablet by mouth the remaining days until gone  Dispense: 6 Tab; Refill: 0  - POCT Rapid Strep A  Patient given contingent abx and will continue OTC remedies for the next several days and if no relief he may start on abx at that time.  Differential diagnosis, natural history, supportive care, and indications for immediate follow-up discussed.   Patient given instructions and understanding of medications and treatment.    If not improving in 3-5 days, F/U with PCP or return to  if symptoms worsen.    Patient agreeable to plan.      Please note that this dictation was created using voice recognition software. I have made every reasonable attempt to correct obvious errors, but I expect that there are errors of grammar and possibly content that I did not discover before finalizing the note.    Pb Ceja PA-C

## 2020-01-05 DIAGNOSIS — N40.0 ENLARGED PROSTATE: ICD-10-CM

## 2020-01-07 RX ORDER — TAMSULOSIN HYDROCHLORIDE 0.4 MG/1
CAPSULE ORAL
Qty: 90 CAP | Refills: 1 | Status: SHIPPED | OUTPATIENT
Start: 2020-01-07 | End: 2020-07-08

## 2020-07-05 DIAGNOSIS — N40.0 ENLARGED PROSTATE: ICD-10-CM

## 2020-07-07 RX ORDER — ATORVASTATIN CALCIUM 20 MG/1
TABLET, FILM COATED ORAL
Qty: 90 TAB | Refills: 0 | Status: SHIPPED | OUTPATIENT
Start: 2020-07-07 | End: 2020-10-05

## 2020-07-07 NOTE — TELEPHONE ENCOUNTER
Last seen by PCP 07/10/2019. Will send 3 month(s) to the pharmacy.  Lab Results   Component Value Date/Time    CHOLSTRLTOT 135 06/15/2019 09:50 AM    LDL 71 06/15/2019 09:50 AM    HDL 46 06/15/2019 09:50 AM    TRIGLYCERIDE 89 06/15/2019 09:50 AM       Lab Results   Component Value Date/Time    SODIUM 142 06/15/2019 09:50 AM    POTASSIUM 4.1 06/15/2019 09:50 AM    CHLORIDE 107 06/15/2019 09:50 AM    CO2 25 06/15/2019 09:50 AM    GLUCOSE 103 (H) 06/15/2019 09:50 AM    BUN 19 06/15/2019 09:50 AM    CREATININE 0.83 06/15/2019 09:50 AM     Lab Results   Component Value Date/Time    ALKPHOSPHAT 42 06/15/2019 09:50 AM    ASTSGOT 26 06/15/2019 09:50 AM    ALTSGPT 37 06/15/2019 09:50 AM    TBILIRUBIN 1.4 06/15/2019 09:50 AM      Please remind pt to get labs done

## 2020-07-08 RX ORDER — TAMSULOSIN HYDROCHLORIDE 0.4 MG/1
CAPSULE ORAL
Qty: 90 CAP | Refills: 0 | Status: SHIPPED | OUTPATIENT
Start: 2020-07-08 | End: 2020-10-05

## 2020-07-08 NOTE — TELEPHONE ENCOUNTER
Last seen by PCP 7/10/2019. Pt to make appt prior to more refills.   Will send 3 month(s) to the pharmacy.

## 2020-08-05 ENCOUNTER — TELEPHONE (OUTPATIENT)
Dept: MEDICAL GROUP | Facility: PHYSICIAN GROUP | Age: 65
End: 2020-08-05

## 2020-09-04 DIAGNOSIS — Z11.59 ENCOUNTER FOR HEPATITIS C SCREENING TEST FOR LOW RISK PATIENT: ICD-10-CM

## 2020-09-04 DIAGNOSIS — E78.5 DYSLIPIDEMIA: ICD-10-CM

## 2020-09-04 DIAGNOSIS — Z12.5 SCREENING FOR MALIGNANT NEOPLASM OF PROSTATE: ICD-10-CM

## 2020-10-02 DIAGNOSIS — N40.0 ENLARGED PROSTATE: ICD-10-CM

## 2020-10-05 RX ORDER — ATORVASTATIN CALCIUM 20 MG/1
20 TABLET, FILM COATED ORAL DAILY
Qty: 90 TAB | Refills: 0 | Status: SHIPPED | OUTPATIENT
Start: 2020-10-05 | End: 2020-10-20 | Stop reason: SDUPTHER

## 2020-10-05 RX ORDER — TAMSULOSIN HYDROCHLORIDE 0.4 MG/1
0.4 CAPSULE ORAL
Qty: 90 CAP | Refills: 0 | Status: SHIPPED | OUTPATIENT
Start: 2020-10-05 | End: 2020-10-20 | Stop reason: SDUPTHER

## 2020-10-14 ENCOUNTER — HOSPITAL ENCOUNTER (OUTPATIENT)
Dept: LAB | Facility: MEDICAL CENTER | Age: 65
End: 2020-10-14
Attending: FAMILY MEDICINE
Payer: COMMERCIAL

## 2020-10-14 DIAGNOSIS — Z11.59 ENCOUNTER FOR HEPATITIS C SCREENING TEST FOR LOW RISK PATIENT: ICD-10-CM

## 2020-10-14 DIAGNOSIS — E78.5 DYSLIPIDEMIA: ICD-10-CM

## 2020-10-14 DIAGNOSIS — Z12.5 SCREENING FOR MALIGNANT NEOPLASM OF PROSTATE: ICD-10-CM

## 2020-10-14 LAB
ALBUMIN SERPL BCP-MCNC: 4.3 G/DL (ref 3.2–4.9)
ALBUMIN/GLOB SERPL: 1.6 G/DL
ALP SERPL-CCNC: 52 U/L (ref 30–99)
ALT SERPL-CCNC: 22 U/L (ref 2–50)
ANION GAP SERPL CALC-SCNC: 11 MMOL/L (ref 7–16)
AST SERPL-CCNC: 23 U/L (ref 12–45)
BILIRUB SERPL-MCNC: 1 MG/DL (ref 0.1–1.5)
BUN SERPL-MCNC: 21 MG/DL (ref 8–22)
CALCIUM SERPL-MCNC: 8.9 MG/DL (ref 8.5–10.5)
CHLORIDE SERPL-SCNC: 103 MMOL/L (ref 96–112)
CHOLEST SERPL-MCNC: 195 MG/DL (ref 100–199)
CO2 SERPL-SCNC: 25 MMOL/L (ref 20–33)
CREAT SERPL-MCNC: 0.76 MG/DL (ref 0.5–1.4)
FASTING STATUS PATIENT QL REPORTED: NORMAL
GLOBULIN SER CALC-MCNC: 2.7 G/DL (ref 1.9–3.5)
GLUCOSE SERPL-MCNC: 102 MG/DL (ref 65–99)
HCV AB SER QL: NORMAL
HDLC SERPL-MCNC: 49 MG/DL
LDLC SERPL CALC-MCNC: 100 MG/DL
POTASSIUM SERPL-SCNC: 4.3 MMOL/L (ref 3.6–5.5)
PROT SERPL-MCNC: 7 G/DL (ref 6–8.2)
PSA SERPL-MCNC: 0.49 NG/ML (ref 0–4)
SODIUM SERPL-SCNC: 139 MMOL/L (ref 135–145)
TRIGL SERPL-MCNC: 230 MG/DL (ref 0–149)

## 2020-10-14 PROCEDURE — 84153 ASSAY OF PSA TOTAL: CPT

## 2020-10-14 PROCEDURE — 80053 COMPREHEN METABOLIC PANEL: CPT

## 2020-10-14 PROCEDURE — 86803 HEPATITIS C AB TEST: CPT

## 2020-10-14 PROCEDURE — 80061 LIPID PANEL: CPT

## 2020-10-14 PROCEDURE — 36415 COLL VENOUS BLD VENIPUNCTURE: CPT

## 2020-10-20 ENCOUNTER — OFFICE VISIT (OUTPATIENT)
Dept: MEDICAL GROUP | Facility: PHYSICIAN GROUP | Age: 65
End: 2020-10-20
Payer: COMMERCIAL

## 2020-10-20 VITALS
RESPIRATION RATE: 16 BRPM | SYSTOLIC BLOOD PRESSURE: 112 MMHG | BODY MASS INDEX: 29.41 KG/M2 | HEART RATE: 85 BPM | TEMPERATURE: 97.8 F | WEIGHT: 183 LBS | DIASTOLIC BLOOD PRESSURE: 74 MMHG | OXYGEN SATURATION: 96 % | HEIGHT: 66 IN

## 2020-10-20 DIAGNOSIS — M62.838 MUSCLE SPASM: ICD-10-CM

## 2020-10-20 DIAGNOSIS — M85.621 BONE CYST OF RIGHT HUMERUS: ICD-10-CM

## 2020-10-20 DIAGNOSIS — N40.0 BENIGN PROSTATIC HYPERPLASIA WITHOUT LOWER URINARY TRACT SYMPTOMS: ICD-10-CM

## 2020-10-20 DIAGNOSIS — R73.01 ELEVATED FASTING GLUCOSE: ICD-10-CM

## 2020-10-20 DIAGNOSIS — N40.0 ENLARGED PROSTATE: ICD-10-CM

## 2020-10-20 DIAGNOSIS — E78.5 DYSLIPIDEMIA: ICD-10-CM

## 2020-10-20 DIAGNOSIS — L98.9 SKIN LESION: ICD-10-CM

## 2020-10-20 DIAGNOSIS — Z98.1 S/P CERVICAL SPINAL FUSION: ICD-10-CM

## 2020-10-20 DIAGNOSIS — B00.1 COLD SORE: ICD-10-CM

## 2020-10-20 PROCEDURE — 17000 DESTRUCT PREMALG LESION: CPT | Performed by: FAMILY MEDICINE

## 2020-10-20 PROCEDURE — 99214 OFFICE O/P EST MOD 30 MIN: CPT | Mod: 25 | Performed by: FAMILY MEDICINE

## 2020-10-20 RX ORDER — DIAZEPAM 2 MG/1
2 TABLET ORAL EVERY 6 HOURS PRN
Qty: 30 TAB | Refills: 0 | Status: SHIPPED | OUTPATIENT
Start: 2020-10-20 | End: 2021-01-18

## 2020-10-20 RX ORDER — VALACYCLOVIR HYDROCHLORIDE 1 G/1
1000 TABLET, FILM COATED ORAL 2 TIMES DAILY
Qty: 20 TAB | Refills: 5 | Status: SHIPPED | OUTPATIENT
Start: 2020-10-20 | End: 2023-11-02 | Stop reason: SDUPTHER

## 2020-10-20 RX ORDER — TAMSULOSIN HYDROCHLORIDE 0.4 MG/1
0.4 CAPSULE ORAL
Qty: 90 CAP | Refills: 3 | Status: SHIPPED | OUTPATIENT
Start: 2020-10-20 | End: 2021-12-07

## 2020-10-20 RX ORDER — ZINC SULFATE 50(220)MG
220 CAPSULE ORAL DAILY
COMMUNITY
End: 2021-04-27

## 2020-10-20 RX ORDER — ATORVASTATIN CALCIUM 20 MG/1
20 TABLET, FILM COATED ORAL DAILY
Qty: 90 TAB | Refills: 3 | Status: SHIPPED | OUTPATIENT
Start: 2020-10-20 | End: 2021-12-07

## 2020-10-20 ASSESSMENT — PATIENT HEALTH QUESTIONNAIRE - PHQ9: CLINICAL INTERPRETATION OF PHQ2 SCORE: 0

## 2020-10-20 NOTE — ASSESSMENT & PLAN NOTE
Pt has hx of spinal fusion, he has occasional severe muscle spasms  He has tried muscle relaxants like flexril and methocarbamol in the past but these give him bad side effects even with low doses he feel like he has a hang over.   His orthopedic specialist in the past has treated him with diazepam low dose. He takes this very sparingly a Rx for 30 pills  will last almost a yr  Advised pt on risks and side effects including addiction, dependence, sedation, falls, accidents.  rx for 30 pills diazepam 2 mg as needed   reviewed no concerns.

## 2020-10-20 NOTE — LETTER
Critical access hospital  Don Rose M.D.  [address]  [place] [address]  Fax: [phone]   Authorization for Release/Disclosure of   Protected Health Information   Name: KAYCE RANDALL : 1955 SSN: [id number]   Address: [address] [place] [address] [place] [address] Phone:    [phone] (home)    I authorize the entity listed below to release/disclose the PHI below to:   Critical access hospital/Don Rose M.D. and Don Rose M.D.   Provider or Entity Name:  [facility]   Address   City, State, Zip  [place] Phone:      Fax:     Reason for request: continuity of care   Information to be released:    [  ] LAST COLONOSCOPY,  including any PATH REPORT and follow-up  [  ] LAST FIT/COLOGUARD RESULT [  ] LAST DEXA  [  ] LAST MAMMOGRAM  [  ] LAST PAP  [  ] LAST LABS [  ] RETINA EXAM REPORT  [  ] IMMUNIZATION RECORDS  [  ] Release all info      [  ] Check here and initial the line next to each item to release ALL health information INCLUDING  _____ Care and treatment for drug and / or alcohol abuse  _____ HIV testing, infection status, or AIDS  _____ Genetic Testing    DATES OF SERVICE OR TIME PERIOD TO BE DISCLOSED: _____________  I understand and acknowledge that:  * This Authorization may be revoked at any time by you in writing, except if your health information has already been used or disclosed.  * Your health information that will be used or disclosed as a result of you signing this authorization could be re-disclosed by the recipient. If this occurs, your re-disclosed health information may no longer be protected by State or Federal laws.  * You may refuse to sign this Authorization. Your refusal will not affect your ability to obtain treatment.  * This Authorization becomes effective upon signing and will  on (date) __________.      If no date is indicated, this Authorization will  one (1) year from the signature date.    Name: Kayce Randall    Signature:   Date:     10/20/2020       PLEASE FAX REQUESTED  RECORDS BACK TO: [phone]

## 2020-10-20 NOTE — ASSESSMENT & PLAN NOTE
Patient with pain with raising right arm, improved with shoulder injection. He had imaging with MRI done, and was evaluated by orthopedics specialist at [facility].

## 2020-10-20 NOTE — ASSESSMENT & PLAN NOTE
Skin cleaned with alcohol pads and Right upper chest AK and right arm AK cryotherapy done with liquid nitrogen with pulse application till ice ball persistent.   Antibacterial ointment applied.   Patient tolerated the procedure well.

## 2020-10-21 NOTE — PROGRESS NOTES
Subjective:   Amadeo Randall is a 65 y.o. male here today for evaluation and management of:     Skin lesion  Skin cleaned with alcohol pads and Right upper chest AK and right arm AK cryotherapy done with liquid nitrogen with pulse application till ice ball persistent.   Antibacterial ointment applied.   Patient tolerated the procedure well.       Bone cyst of right humerus  Patient with pain with raising right arm, improved with shoulder injection. He had imaging with MRI done, and was evaluated by orthopedics specialist at [facility].     S/P cervical spinal fusion  Pt has hx of spinal fusion, he has occasional severe muscle spasms  He has tried muscle relaxants like flexril and methocarbamol in the past but these give him bad side effects even with low doses he feel like he has a hang over.   His orthopedic specialist in the past has treated him with diazepam low dose. He takes this very sparingly a Rx for 30 pills  will last almost a yr  Advised pt on risks and side effects including addiction, dependence, sedation, falls, accidents.  rx for 30 pills diazepam 2 mg as needed   reviewed no concerns.     Benign prostatic hyperplasia without lower urinary tract symptoms  Symptoms controlled with flomax  Normal PSA reviewed.          Current medicines (including changes today)  Current Outpatient Medications   Medication Sig Dispense Refill   • Multiple Vitamins-Minerals (ZINC PO) Take  by mouth.     • zinc sulfate (ZINCATE) 220 (50 Zn) MG Cap Take 220 mg by mouth every day.     • diazePAM (VALIUM) 2 MG Tab Take 1 Tab by mouth every 6 hours as needed for Anxiety for up to 90 days. 30 Tab 0   • tamsulosin (FLOMAX) 0.4 MG capsule Take 1 Cap by mouth ONE-HALF HOUR AFTER BREAKFAST. 90 Cap 3   • atorvastatin (LIPITOR) 20 MG Tab Take 1 Tab by mouth every day. 90 Tab 3   • valacyclovir (VALTREX) 1 GM Tab Take 1 Tab by mouth 2 times a day. TAKE 2 TABLETS BY MOUTH TWICE DAILY FOR 2 DAYS ,  USE  AT  THE  FIRST  SIGN  OF   "SYMPTOMS 20 Tab 5   • Coenzyme Q10 (CO Q-10) 300 MG Cap Take  by mouth.     • aspirin (ASA) 81 MG Chew Tab chewable tablet Take 81 mg by mouth every day.     • glucosamine Sulfate 500 MG Cap Take 500 mg by mouth every day.     • Omega-3 Fatty Acids (FISH OIL) 1000 MG Cap capsule Take 1,000 mg by mouth 3 times a day, with meals.       No current facility-administered medications for this visit.      He  has no past medical history on file.    ROS  No chest pain, no shortness of breath, no abdominal pain       Objective:     /74   Pulse 85   Temp 36.6 °C (97.8 °F) (Temporal)   Resp 16   Ht 1.676 m (5' 6\")   Wt 83 kg (183 lb)   SpO2 96%  Body mass index is 29.54 kg/m².   Physical Exam:  Constitutional: Alert, no distress.  Skin: Warm, dry, good turgor, no rashes in visible areas.  Eye: Equal, round and reactive, conjunctiva clear, lids normal.  ENMT: Lips without lesions, good dentition, oropharynx clear.  Neck: Trachea midline, no masses, no thyromegaly. No cervical or supraclavicular lymphadenopathy  Respiratory: Unlabored respiratory effort, lungs clear to auscultation, no wheezes, no ronchi.  Cardiovascular: Normal S1, S2, no murmur, no edema.  Abdomen: Soft, non-tender, no masses, no hepatosplenomegaly.  Psych: Alert and oriented x3, normal affect and mood.        Assessment and Plan:   The following treatment plan was discussed    1. Skin lesion  Cryotherapy treatment done.   Advised to follow up with dermatology for skin survey.     2. Bone cyst of right humerus  Patient is being evaluated by orthopedic specialists for this.     3. Muscle spasm  - diazePAM (VALIUM) 2 MG Tab; Take 1 Tab by mouth every 6 hours as needed for Anxiety for up to 90 days.  Dispense: 30 Tab; Refill: 0    4. S/P cervical spinal fusion  Chronic condition, no acute changes.     5. Dyslipidemia  - Comp Metabolic Panel; Future  - Lipid Profile; Future  - atorvastatin (LIPITOR) 20 MG Tab; Take 1 Tab by mouth every day.  Dispense: " 90 Tab; Refill: 3    6. Elevated fasting glucose  - HEMOGLOBIN A1C; Future    7. Enlarged prostate  - tamsulosin (FLOMAX) 0.4 MG capsule; Take 1 Cap by mouth ONE-HALF HOUR AFTER BREAKFAST.  Dispense: 90 Cap; Refill: 3    8. Cold sore  - valacyclovir (VALTREX) 1 GM Tab; Take 1 Tab by mouth 2 times a day. TAKE 2 TABLETS BY MOUTH TWICE DAILY FOR 2 DAYS ,  USE  AT  THE  FIRST  SIGN  OF  SYMPTOMS  Dispense: 20 Tab; Refill: 5        Followup: Return in about 3 months (around 1/20/2021) for dyslipidemia, elevated glucose.

## 2020-12-02 ENCOUNTER — TELEPHONE (OUTPATIENT)
Dept: MEDICAL GROUP | Facility: PHYSICIAN GROUP | Age: 65
End: 2020-12-02

## 2020-12-02 DIAGNOSIS — Q78.6 MULTIPLE OSTEOCHONDROMA: ICD-10-CM

## 2021-02-15 ENCOUNTER — OFFICE VISIT (OUTPATIENT)
Dept: URGENT CARE | Facility: PHYSICIAN GROUP | Age: 66
End: 2021-02-15
Payer: COMMERCIAL

## 2021-02-15 VITALS
HEART RATE: 75 BPM | SYSTOLIC BLOOD PRESSURE: 140 MMHG | WEIGHT: 183 LBS | HEIGHT: 65 IN | OXYGEN SATURATION: 100 % | RESPIRATION RATE: 16 BRPM | DIASTOLIC BLOOD PRESSURE: 70 MMHG | TEMPERATURE: 98.2 F | BODY MASS INDEX: 30.49 KG/M2

## 2021-02-15 DIAGNOSIS — R10.9 RIGHT FLANK PAIN: ICD-10-CM

## 2021-02-15 DIAGNOSIS — R31.9 HEMATURIA, UNSPECIFIED TYPE: ICD-10-CM

## 2021-02-15 PROCEDURE — 99214 OFFICE O/P EST MOD 30 MIN: CPT | Performed by: PHYSICIAN ASSISTANT

## 2021-02-15 RX ORDER — ONDANSETRON 4 MG/1
4 TABLET, ORALLY DISINTEGRATING ORAL EVERY 8 HOURS PRN
Qty: 20 TABLET | Refills: 0 | Status: SHIPPED | OUTPATIENT
Start: 2021-02-15 | End: 2021-04-27

## 2021-02-15 RX ORDER — OXYCODONE HYDROCHLORIDE AND ACETAMINOPHEN 5; 325 MG/1; MG/1
1 TABLET ORAL EVERY 6 HOURS PRN
Qty: 20 TABLET | Refills: 0 | Status: SHIPPED | OUTPATIENT
Start: 2021-02-15 | End: 2021-02-20

## 2021-02-15 RX ORDER — MULTIVIT-MIN/IRON FUM/FOLIC AC 7.5 MG-4
TABLET ORAL
COMMUNITY
End: 2021-04-27

## 2021-02-15 RX ORDER — KETOROLAC TROMETHAMINE 30 MG/ML
30 INJECTION, SOLUTION INTRAMUSCULAR; INTRAVENOUS ONCE
Status: COMPLETED | OUTPATIENT
Start: 2021-02-15 | End: 2021-02-15

## 2021-02-15 RX ADMIN — KETOROLAC TROMETHAMINE 30 MG: 30 INJECTION, SOLUTION INTRAMUSCULAR; INTRAVENOUS at 19:06

## 2021-02-15 ASSESSMENT — PAIN SCALES - GENERAL: PAINLEVEL: 8=MODERATE-SEVERE PAIN

## 2021-02-16 NOTE — PROGRESS NOTES
Chief Complaint   Patient presents with   • Back Pain     lower (R) side back pain, started no more than 1 hour ago       HISTORY OF PRESENT ILLNESS: Patient is a 65 y.o. male who presents today for the following:    Patient comes in for evaluation of a right flank pain that started about 1745 hrs. tonight.  He had pelvic pain earlier today but it resolved.  He cannot get comfortable, stating there are no specific triggers or relievers of the pain.  He denies fever, urinary symptoms, testicular pain.  He denies any history of renal stones.  He does take Flomax for BPH.    Patient Active Problem List    Diagnosis Date Noted   • Multiple osteochondroma 12/02/2020   • Bone cyst of right humerus 10/20/2020   • Skin lesion 06/11/2018   • Dyslipidemia 02/27/2018   • Benign prostatic hyperplasia without lower urinary tract symptoms 02/27/2018   • S/P cervical spinal fusion 02/27/2018   • History of total replacement of both hip joints 02/27/2018   • CAROLIN on CPAP 02/27/2018       Allergies:Pcn [penicillins]    Current Outpatient Medications Ordered in Epic   Medication Sig Dispense Refill   • oxyCODONE-acetaminophen (PERCOCET) 5-325 MG Tab Take 1 tablet by mouth every 6 hours as needed for Moderate Pain for up to 5 days. 20 tablet 0   • ondansetron (ZOFRAN ODT) 4 MG TABLET DISPERSIBLE Take 1 tablet by mouth every 8 hours as needed for Nausea. 20 tablet 0   • zinc sulfate (ZINCATE) 220 (50 Zn) MG Cap Take 220 mg by mouth every day.     • tamsulosin (FLOMAX) 0.4 MG capsule Take 1 Cap by mouth ONE-HALF HOUR AFTER BREAKFAST. 90 Cap 3   • atorvastatin (LIPITOR) 20 MG Tab Take 1 Tab by mouth every day. 90 Tab 3   • valacyclovir (VALTREX) 1 GM Tab Take 1 Tab by mouth 2 times a day. TAKE 2 TABLETS BY MOUTH TWICE DAILY FOR 2 DAYS ,  USE  AT  THE  FIRST  SIGN  OF  SYMPTOMS 20 Tab 5   • Coenzyme Q10 (CO Q-10) 300 MG Cap Take  by mouth.     • aspirin (ASA) 81 MG Chew Tab chewable tablet Take 81 mg by mouth every day.     • glucosamine  "Sulfate 500 MG Cap Take 500 mg by mouth every day.     • Omega-3 Fatty Acids (FISH OIL) 1000 MG Cap capsule Take 1,000 mg by mouth 3 times a day, with meals.     • Multiple Vitamins-Minerals (MULTIVITAMIN WITH MINERALS) tablet Take  by mouth.     • Multiple Vitamins-Minerals (ZINC PO) Take  by mouth.       No current Epic-ordered facility-administered medications on file.       History reviewed. No pertinent past medical history.    Social History     Tobacco Use   • Smoking status: Never Smoker   • Smokeless tobacco: Never Used   Substance Use Topics   • Alcohol use: Yes     Comment: 1 beer per day   • Drug use: No       No family status information on file.   History reviewed. No pertinent family history.    Review of Systems:   Constitutional ROS: No unexpected change in weight, No weakness, No fatigue  Pulmonary ROS: No chronic cough, sputum, or hemoptysis, No dyspnea on exertion, No wheezing  Cardiovascular ROS: No diaphoresis, No edema, No palpitations  Gastrointestinal ROS: No change in bowel habits, No significant change in appetite, No nausea, vomiting, diarrhea, or constipation  Hematologic/Lymphatic ROS: No chills, No night sweats, No weight loss  Skin/Integumentary ROS: No edema, No evidence of rash, No itching      Exam:  /70   Pulse 75   Temp 36.8 °C (98.2 °F) (Temporal)   Resp 16   Ht 1.651 m (5' 5\")   Wt 83 kg (183 lb)   SpO2 100%   General: Well developed, well nourished.  Pacing the room, clearly uncomfortable  Pulmonary: Unlabored respiratory effort.   Back: No CVA tenderness noted.  Abdomen: Soft, nondistended, nontender to palpation.  Neurologic: Grossly nonfocal. No facial asymmetry noted.  Skin: Warm, dry, good turgor. No rashes in visible areas.   Psych: Normal mood. Alert and oriented x3. Judgment and insight is normal.    UA: hematuria    Toradol 30 mg given in clinic    Assessment/Plan:  Patient's symptoms are suspicious for renal stone.  Discussed increasing fluids.  CT is " unavailable at this location.  Will have patient schedule this as an outpatient if symptoms do not improve.  Discussed ER precautions if symptoms worsen at any point.    Discussed the Controlled Substance Use Informed Consent which includes risks and benefits, proper use, storage and disposal, refills, minors, opioids and narcotics, and alternatives. I have assessed the patient’s risk for abuse, dependency, and addiction using the validated Opioid Risk Tool available at https://www.[employer].com/pxvegw-ruiq-jtvl-ort-narcotic-abuse. All questions answered.    Prescription Monitoring Program report was reviewed. No evidence of medication abuse or misuse.     1. Hematuria, unspecified type  ketorolac (TORADOL) injection 30 mg    oxyCODONE-acetaminophen (PERCOCET) 5-325 MG Tab    ondansetron (ZOFRAN ODT) 4 MG TABLET DISPERSIBLE    CT-RENAL COLIC EVALUATION(A/P W/O)   2. Right flank pain  ketorolac (TORADOL) injection 30 mg    oxyCODONE-acetaminophen (PERCOCET) 5-325 MG Tab    ondansetron (ZOFRAN ODT) 4 MG TABLET DISPERSIBLE    CT-RENAL COLIC EVALUATION(A/P W/O)

## 2021-03-05 ENCOUNTER — HOSPITAL ENCOUNTER (OUTPATIENT)
Facility: MEDICAL CENTER | Age: 66
End: 2021-03-05
Attending: UROLOGY
Payer: COMMERCIAL

## 2021-03-05 PROCEDURE — 82365 CALCULUS SPECTROSCOPY: CPT

## 2021-03-10 LAB
APPEARANCE STONE: NORMAL
COMPN STONE: NORMAL
NUMBER STONE: 1
SIZE STONE: NORMAL MM
SPECIMEN WT: 7 MG

## 2021-04-27 ENCOUNTER — OFFICE VISIT (OUTPATIENT)
Dept: MEDICAL GROUP | Facility: PHYSICIAN GROUP | Age: 66
End: 2021-04-27
Payer: COMMERCIAL

## 2021-04-27 VITALS
HEART RATE: 64 BPM | HEIGHT: 66 IN | RESPIRATION RATE: 16 BRPM | TEMPERATURE: 97.2 F | DIASTOLIC BLOOD PRESSURE: 78 MMHG | OXYGEN SATURATION: 98 % | SYSTOLIC BLOOD PRESSURE: 116 MMHG | WEIGHT: 177 LBS | BODY MASS INDEX: 28.45 KG/M2

## 2021-04-27 DIAGNOSIS — L98.9 SKIN LESION: ICD-10-CM

## 2021-04-27 DIAGNOSIS — E78.5 DYSLIPIDEMIA: ICD-10-CM

## 2021-04-27 DIAGNOSIS — G47.33 OSA ON CPAP: ICD-10-CM

## 2021-04-27 DIAGNOSIS — N40.0 BENIGN PROSTATIC HYPERPLASIA WITHOUT LOWER URINARY TRACT SYMPTOMS: ICD-10-CM

## 2021-04-27 PROCEDURE — 17000 DESTRUCT PREMALG LESION: CPT | Performed by: FAMILY MEDICINE

## 2021-04-27 PROCEDURE — 17003 DESTRUCT PREMALG LES 2-14: CPT | Performed by: FAMILY MEDICINE

## 2021-04-27 PROCEDURE — 99214 OFFICE O/P EST MOD 30 MIN: CPT | Mod: 25 | Performed by: FAMILY MEDICINE

## 2021-04-27 RX ORDER — DIAZEPAM 2 MG/1
TABLET ORAL
COMMUNITY
End: 2023-11-02 | Stop reason: SDUPTHER

## 2021-04-27 RX ORDER — ATORVASTATIN CALCIUM 20 MG/1
20 TABLET, FILM COATED ORAL
COMMUNITY
End: 2021-04-27

## 2021-04-27 RX ORDER — TAMSULOSIN HYDROCHLORIDE 0.4 MG/1
0.4 CAPSULE ORAL
COMMUNITY
End: 2021-04-27

## 2021-04-27 RX ORDER — CHLORAL HYDRATE 500 MG
1000 CAPSULE ORAL
COMMUNITY
End: 2021-04-27

## 2021-04-27 RX ORDER — ASPIRIN 81 MG/1
81 TABLET, CHEWABLE ORAL
COMMUNITY
End: 2021-04-27

## 2021-04-27 RX ORDER — VIT C/B6/B5/MAGNESIUM/HERB 173 50-5-6-5MG
1500 CAPSULE ORAL
COMMUNITY

## 2021-04-27 RX ORDER — GLUCOSAMINE SULFATE 500 MG
500 CAPSULE ORAL
COMMUNITY
End: 2021-04-27

## 2021-04-27 RX ORDER — VITAMIN B COMPLEX
5000 TABLET ORAL DAILY
COMMUNITY

## 2021-04-27 RX ORDER — VALACYCLOVIR HYDROCHLORIDE 1 G/1
1000 TABLET, FILM COATED ORAL
COMMUNITY
Start: 2020-10-20 | End: 2021-04-27

## 2021-04-27 ASSESSMENT — PATIENT HEALTH QUESTIONNAIRE - PHQ9: CLINICAL INTERPRETATION OF PHQ2 SCORE: 0

## 2021-04-27 NOTE — ASSESSMENT & PLAN NOTE
Actinic keratoses on left forehead and left ear.   Risks and benefits discussed.   Skin cleaned with alcohol swabs and treatment with cryotherapy x 2 done for each lesion.

## 2021-04-27 NOTE — ASSESSMENT & PLAN NOTE
This is a chronic condition currently stable patient is on Flomax with no side effects of blurry vision, is tolerating this well.  He is followed by his urologist Dr. Fritz  Had a recent kidney stone evaluated by the specialist also.  I will request records.

## 2021-04-27 NOTE — ASSESSMENT & PLAN NOTE
Elevated triglycerides on fish oil with no side effects of burping.   Has been losing weight intentionally and implementing diet changes.

## 2021-04-27 NOTE — PATIENT INSTRUCTIONS
Actinic Keratosis  An actinic keratosis is a precancerous growth on the skin. If there is more than one growth, the condition is called actinic keratoses. Actinic keratoses appear most often on areas of skin that get a lot of sun exposure, including the scalp, face, ears, lips, upper back, forearms, and the backs of the hands.  If left untreated, these growths may develop into a skin cancer called squamous cell carcinoma. It is important to have all these growths checked by a health care provider to determine the best treatment approach.  What are the causes?  Actinic keratoses are caused by getting too much ultraviolet (UV) radiation from the sun or other UV light sources.  What increases the risk?  You are more likely to develop this condition if you:  · Have light-colored skin and blue eyes.  · Have blond or red hair.  · Spend a lot of time in the sun.  · Do not protect your skin from the sun when outdoors.  · Are an older person. The risk of developing an actinic keratosis increases with age.  What are the signs or symptoms?  Actinic keratoses feel like scaly, rough spots of skin. Symptoms of this condition include growths that may:  · Be as small as a pinhead or as big as a quarter.  · Itch, hurt, or feel sensitive.  · Be skin-colored, light tan, dark tan, pink, or a combination of any of these colors. In most cases, the growths become red.  · Have a small piece of pink or gray skin (skin tag) growing from them.  It may be easier to notice actinic keratoses by feeling them, rather than seeing them. Sometimes, actinic keratoses disappear, but many reappear a few days to a few weeks later.  How is this diagnosed?  This condition is usually diagnosed with a physical exam.  · A tissue sample may be removed from the actinic keratosis and examined under a microscope (biopsy).  How is this treated?  If needed, this condition may be treated by:  · Scraping off the actinic keratosis (curettage).  · Freezing the actinic  keratosis with liquid nitrogen (cryosurgery). This causes the growth to eventually fall off the skin.  · Applying medicated creams or gels to destroy the cells in the growth.  · Applying chemicals to the actinic keratosis to make the outer layers of skin peel off (chemical peel).  · Using photodynamic therapy. In this procedure, medicated cream is applied to the actinic keratosis. This cream increases your skin's sensitivity to light. Then, a strong light is aimed at the actinic keratosis to destroy cells in the growth.  Follow these instructions at home:  Skin care  · Apply cool, wet cloths (cool compresses) to the affected areas.  · Do not scratch your skin.  · Check your skin regularly for any growths, especially growths that:  ? Start to itch or bleed.  ? Change in size, shape, or color.  Caring for the treated area  · Keep the treated area clean and dry as told by your health care provider.  · Do not apply any medicine, cream, or lotion to the treated area unless your health care provider tells you to do that.  · Do not pick at blisters or try to break them open. This can cause infection and scarring.  · If you have red or irritated skin after treatment, follow instructions from your health care provider about how to take care of the treated area. Make sure you:  ? Wash your hands with soap and water before you change your bandage (dressing). If soap and water are not available, use hand .  ? Change your dressing as told by your health care provider.  · If you have red or irritated skin after treatment, check your treated area every day for signs of infection. Check for:  ? Redness, swelling, or pain.  ? Fluid or blood.  ? Warmth.  ? Pus or a bad smell.  General instructions  · Take or apply over-the-counter and prescription medicines only as told by your health care provider.  · Return to your normal activities as told by your health care provider. Ask your health care provider what activities are  safe for you.  · Have a skin exam done every year by a health care provider who is a skin specialist (dermatologist).  · Keep all follow-up visits as told by your health care provider. This is important.  Lifestyle  · Do not use any products that contain nicotine or tobacco, such as cigarettes and e-cigarettes. If you need help quitting, ask your health care provider.  · Take steps to protect your skin from the sun.  ? Try to avoid the sun between 10:00 a.m. and 4:00 p.m. This is when the UV light is the strongest.  ? Use a sunscreen or sunblock with SPF 30 (sun protection factor 30) or greater.  ? Apply sunscreen before you are exposed to sunlight and reapply as often as directed by the instructions on the sunscreen container.  ? Always wear sunglasses that have UV protection, and always wear a hat and clothing to protect your skin from sunlight.  ? When possible, avoid medicines that increase your sensitivity to sunlight.  ? Do not use tanning beds or other indoor tanning devices.  Contact a health care provider if:  · You notice any changes or new growths on your skin.  · You have swelling, pain, or more redness around your treated area.  · You have fluid or blood coming from your treated area.  · Your treated area feels warm to the touch.  · You have pus or a bad smell coming from your treated area.  · You have a fever.  · You have a blister that becomes large and painful.  Summary  · An actinic keratosis is a precancerous growth on the skin. If there is more than one growth, the condition is called actinic keratoses. In some cases, if left untreated, these growths can develop into skin cancer.  · Check your skin regularly for any growths, especially growths that start to itch or bleed, or change in size, shape, or color.  · Take steps to protect your skin from the sun.  · Contact a health care provider if you notice any changes or new growths on your skin.  · Keep all follow-up visits as told by your health  care provider. This is important.  This information is not intended to replace advice given to you by your health care provider. Make sure you discuss any questions you have with your health care provider.  Document Released: 03/16/2010 Document Revised: 04/30/2019 Document Reviewed: 04/30/2019  [employer]vier Patient Education © 2020 [employer]

## 2021-04-27 NOTE — PROGRESS NOTES
Subjective:   Amadeo Randall is a 65 y.o. male here today for evaluation and management of:     Skin lesion  Actinic keratoses on left forehead and left ear.   Risks and benefits discussed.   Skin cleaned with alcohol swabs and treatment with cryotherapy x 2 done for each lesion.       Benign prostatic hyperplasia without lower urinary tract symptoms  This is a chronic condition currently stable patient is on Flomax with no side effects of blurry vision, is tolerating this well.  He is followed by his urologist Dr. Fritz  Had a recent kidney stone evaluated by the specialist also.  I will request records.      Dyslipidemia  Elevated triglycerides on fish oil with no side effects of burping.   Has been losing weight intentionally and implementing diet changes.     CAROLIN on CPAP  Continues on cpap without oxygen and doing well with good energy levels through the day.            Current medicines (including changes today)  Current Outpatient Medications   Medication Sig Dispense Refill   • diazePAM (VALIUM) 2 MG Tab diazepam 2 mg tablet   TAKE 1 TABLET BY MOUTH EVERY 6 HOURS AS NEEDED FOR ANXIETY FOR UP TO 90 DAYS     • Turmeric 500 MG Cap Take  by mouth.     • Saw Palmetto, Serenoa repens, (SAW PALMETTO PO) Take  by mouth.     • vitamin D (CHOLECALCIFEROL) 1000 Unit (25 mcg) Tab Take 1,000 Units by mouth every day.     • Multiple Vitamins-Minerals (ZINC PO) Take  by mouth.     • tamsulosin (FLOMAX) 0.4 MG capsule Take 1 Cap by mouth ONE-HALF HOUR AFTER BREAKFAST. 90 Cap 3   • atorvastatin (LIPITOR) 20 MG Tab Take 1 Tab by mouth every day. 90 Tab 3   • valacyclovir (VALTREX) 1 GM Tab Take 1 Tab by mouth 2 times a day. TAKE 2 TABLETS BY MOUTH TWICE DAILY FOR 2 DAYS ,  USE  AT  THE  FIRST  SIGN  OF  SYMPTOMS 20 Tab 5   • Coenzyme Q10 (CO Q-10) 300 MG Cap Take  by mouth.     • aspirin (ASA) 81 MG Chew Tab chewable tablet Take 81 mg by mouth every day.     • glucosamine Sulfate 500 MG Cap Take 500 mg by mouth every day.     •  "Omega-3 Fatty Acids (FISH OIL) 1000 MG Cap capsule Take 1,000 mg by mouth 3 times a day, with meals.       No current facility-administered medications for this visit.     He  has no past medical history on file.    ROS  No chest pain, no shortness of breath, no abdominal pain       Objective:     /78   Pulse 64   Temp 36.2 °C (97.2 °F) (Temporal)   Resp 16   Ht 1.664 m (5' 5.5\")   Wt 80.3 kg (177 lb)   SpO2 98%  Body mass index is 29.01 kg/m².   Physical Exam:  Constitutional: Alert, no distress.  Skin: Warm, dry, good turgor, no rashes in visible areas.  Eye: Equal, round and reactive, conjunctiva clear, lids normal.  ENMT: Lips without lesions, good dentition, oropharynx clear.  Neck: Trachea midline, no masses, no thyromegaly. No cervical or supraclavicular lymphadenopathy  Respiratory: Unlabored respiratory effort, lungs clear to auscultation, no wheezes, no ronchi.  Cardiovascular: Normal S1, S2, no murmur, no edema.  Abdomen: Soft, non-tender, no masses, no hepatosplenomegaly.  Psych: Alert and oriented x3, normal affect and mood.        Assessment and Plan:   The following treatment plan was discussed    1. Skin lesion  Treated with cryotherapy.     2. Benign prostatic hyperplasia without lower urinary tract symptoms  Stable.       Followup: Return in about 1 year (around 4/27/2022), or if symptoms worsen or fail to improve, for actinic keratoses, elevated TG, elevated blood sugar.         "

## 2021-05-10 ENCOUNTER — SLEEP CENTER VISIT (OUTPATIENT)
Dept: SLEEP MEDICINE | Facility: MEDICAL CENTER | Age: 66
End: 2021-05-10
Payer: COMMERCIAL

## 2021-05-10 VITALS
SYSTOLIC BLOOD PRESSURE: 138 MMHG | DIASTOLIC BLOOD PRESSURE: 70 MMHG | BODY MASS INDEX: 28.93 KG/M2 | RESPIRATION RATE: 16 BRPM | HEART RATE: 68 BPM | WEIGHT: 180 LBS | HEIGHT: 66 IN | OXYGEN SATURATION: 96 %

## 2021-05-10 DIAGNOSIS — G47.33 OBSTRUCTIVE SLEEP APNEA: ICD-10-CM

## 2021-05-10 DIAGNOSIS — G47.33 OBSTRUCTIVE SLEEP APNEA: Primary | ICD-10-CM

## 2021-05-10 PROCEDURE — 99203 OFFICE O/P NEW LOW 30 MIN: CPT | Performed by: PREVENTIVE MEDICINE

## 2021-05-10 NOTE — PROGRESS NOTES
"  CC: \" [place]. My PAP machine has been working fine so I did not  make it a priority to get established with a sleep medicine doctor here.  But one of the connections is going bad on my machine,  so I finally made a phone call in I am here today to get established with a sleep medicine provider.  I am sure I will need supplies soon.\"      HPI:  Amadeo Randall is a 65 y.o. male kindly referred by Don Rose M.D. Mr. Randall is here to establish care within our sleep medicine department.  He has been using a Respironics REMstar auto since December 2013.  He has done well on CPAP set at 8 cm of water.  His download today shows 100% compliance using Pap therapy at greater than 4 hours at night with an AHI of 3.3.  The date range for this download is April 10, 2021 through May 9, 2021.  The patient does not recall the results of his diagnostic study except \"I guess I woke up a lot\".  He is happy to sign a release for us to get the results of this diagnostic study from [facility] in [facility]. Regarding  specific and pertinent comorbid conditions he is fortunate to have no lung diagnoses and no cardiac diagnoses.  Patient has never smoked and his current challenges may indeed be improved for the ongoing use of CPAP and that is dyslipidemia and weight gain.  He was diagnosed with anxiety years ago and has access to diazepam 2 mg tablets.  However he may take 1 of these once a month or once every 2 months.        Symptom Summary:  WHILE USING PAP he has none of the following sxs:  Snoring: -  Very loud snoring: -  Witnessed apneas: -  Resuscitative snorts: -  Nocturnal shortness of breath:-  Non-restorative sleep: -  EPWORTH SCORE:  4/24, this is  WNL  Insomnia: -  Nocturnal awakenings: -  Nocturia: -  EDS: -  Fatigue: -  Tiredness: -  Falls asleep accidentally: -   Napping or returning to bed after arising: -  Restless legs: -  Limb " movements during sleep: -  Nocturnal headaches: -  Morning Headaches: -    The patient confirmed that without his CPAP therapy he would be snoring loudly every night and waking up without breathing.    Significant comorbidities and modifying factors include      Patient Active Problem List    Diagnosis Date Noted   • Multiple osteochondroma 12/02/2020   • Bone cyst of right humerus 10/20/2020   • Skin lesion 06/11/2018   • Dyslipidemia 02/27/2018   • Benign prostatic hyperplasia without lower urinary tract symptoms 02/27/2018   • S/P cervical spinal fusion 02/27/2018   • History of total replacement of both hip joints 02/27/2018   • CAROLIN on CPAP 02/27/2018   • Benign colonic polyp 05/06/2014   • Actinic keratosis 06/20/2011       Past Medical History:   Diagnosis Date   • Back pain    • Chickenpox    • Hyperlipidemia    • Kidney stone    • Mumps    • Painful joint    • Ringing in ears    • Sleep apnea    • Tonsillitis    • Wears glasses         Past Surgical History:   Procedure Laterality Date   • HIP REPLACEMENT, TOTAL         Family History   Problem Relation Age of Onset   • Alzheimer's Disease Mother         Passed 2012   • Cancer Mother    • Breast Cancer Mother    • Sleep Apnea Brother    • Stroke Father        Social History     Socioeconomic History   • Marital status:      Spouse name: Not on file   • Number of children: Not on file   • Years of education: Not on file   • Highest education level: Not on file   Occupational History   • Not on file   Tobacco Use   • Smoking status: Never Smoker   • Smokeless tobacco: Never Used   Substance and Sexual Activity   • Alcohol use: Yes     Comment: 1 beer per day   • Drug use: No   • Sexual activity: Not on file   Other Topics Concern   • Not on file   Social History Narrative   • Not on file     Social Determinants of Health     Financial Resource Strain:    • Difficulty of Paying Living Expenses:    Food Insecurity:    • Worried About Running Out of Food  in the Last Year:    • Ran Out of Food in the Last Year:    Transportation Needs:    • Lack of Transportation (Medical):    • Lack of Transportation (Non-Medical):    Physical Activity:    • Days of Exercise per Week:    • Minutes of Exercise per Session:    Stress:    • Feeling of Stress :    Social Connections:    • Frequency of Communication with Friends and Family:    • Frequency of Social Gatherings with Friends and Family:    • Attends Shinto Services:    • Active Member of Clubs or Organizations:    • Attends Club or Organization Meetings:    • Marital Status:    Intimate Partner Violence:    • Fear of Current or Ex-Partner:    • Emotionally Abused:    • Physically Abused:    • Sexually Abused:        Current Outpatient Medications   Medication Sig Dispense Refill   • diazePAM (VALIUM) 2 MG Tab diazepam 2 mg tablet   TAKE 1 TABLET BY MOUTH EVERY 6 HOURS AS NEEDED FOR ANXIETY FOR UP TO 90 DAYS     • Turmeric 500 MG Cap Take  by mouth.     • Saw Palmetto, Serenoa repens, (SAW PALMETTO PO) Take  by mouth.     • vitamin D (CHOLECALCIFEROL) 1000 Unit (25 mcg) Tab Take 1,000 Units by mouth every day.     • Multiple Vitamins-Minerals (ZINC PO) Take  by mouth.     • tamsulosin (FLOMAX) 0.4 MG capsule Take 1 Cap by mouth ONE-HALF HOUR AFTER BREAKFAST. 90 Cap 3   • atorvastatin (LIPITOR) 20 MG Tab Take 1 Tab by mouth every day. 90 Tab 3   • valacyclovir (VALTREX) 1 GM Tab Take 1 Tab by mouth 2 times a day. TAKE 2 TABLETS BY MOUTH TWICE DAILY FOR 2 DAYS ,  USE  AT  THE  FIRST  SIGN  OF  SYMPTOMS 20 Tab 5   • Coenzyme Q10 (CO Q-10) 300 MG Cap Take  by mouth.     • aspirin (ASA) 81 MG Chew Tab chewable tablet Take 81 mg by mouth every day.     • glucosamine Sulfate 500 MG Cap Take 500 mg by mouth every day.     • Omega-3 Fatty Acids (FISH OIL) 1000 MG Cap capsule Take 1,000 mg by mouth 3 times a day, with meals.       No current facility-administered medications for this visit.       ALLERGIES: Pcn [penicillins] and  "Skin adhesives  [mecrylate]    ROS    Constitutional: Denies weight loss, fatigue.  Eyes: Denies vision loss,REPORTS: uses  glasses.  Ears/Nose/Mouth/Throat: Denies rhinitis/nasal congestion, injury, decayed teeth/toothaches.  Cardiovascular: Denies chest pain, tightness, palpitations, swelling in legs/feet, difficulty breathing when lying down but gets better when sitting up.   Respiratory: Denies shortness of breath while awake,  Sleep: per HPI  Gastrointestinal: Denies  difficulty swallowing,  heartburn.  Genitourinary:   REPORTS nocturia rarely  Musculoskeletal: Denies painful joints, sore muscles, back pain.   Neurological: Denies frequent headaches,weakness, dizziness.    PHYSICAL EXAM    Neck circumference:16  /70 (BP Location: Left arm, Patient Position: Sitting, BP Cuff Size: Adult)   Pulse 68   Resp 16   Ht 1.664 m (5' 5.5\")   Wt 81.6 kg (180 lb)   SpO2 96%   BMI 29.50 kg/m²   Appearance: Well-nourished, well-developed,  looks stated age, no acute distress  Eyes:   EOMI  ENMT: Mallampati 4 which is  Abnormal, Full Beard  Neck: Supple, trachea midline  Respiratory effort:  No intercostal retractions or use of accessory muscles  Lung auscultation:  No wheezes rhonchi rubs or rales  Cardiac: No murmurs, rubs, or gallops; regular rhythm, normal rate; no edema  Musculoskeletal:  Grossly normal; gait and station normal  Neurologic:  oriented to person, time, place, and purpose; judgement intact  Psychiatric:  No depression, anxiety, agitation        Medical Decision Making:  The medical record was reviewed in its as pertains to this referral. This includes records from primary care, consultants notes,  referral request, hospital records, labs, imaging.    Any available diagnostic and titration nocturnal polysomnograms, home sleep apnea tests, continuous nocturnal oximetry results, multiple sleep latency tests, and compliance reports were reviewed with the patient.        1. Obstructive sleep apnea  - " DME CPAP    2. sleep apnea  - DME CPAP  This patient has been successfully using CPAP since December 27, 2013.  Prior to this visit today, he has received all of his sleep medicine care at [facility] in [facility].  This patient has done very well on CPAP with a pressure of 8 cm water.  He reports a significant benefit and he is 100% compliant.    PLAN:   He does not have his records from [facility].  He was informed today that [employer] insurance company would require those records in order to honor the prescription for supplies and a new machine.  If they are not really available in a timely fashion then he will need to undergo metabolic a home sleep test.  This home sleep test will be done without Pap therapy for diagnostic data collection.    The risks of untreated sleep apnea were discussed with the patient at length. Patients with CAROLIN are at increased risk of cardiovascular disease including coronary artery disease, systemic arterial hypertension, pulmonary arterial hypertension, cardiac arrhythmias, and stroke. CAROLIN patients have an increased risk of motor vehicle accidents, type 2 diabetes, chronic kidney disease, and non-alcoholic liver disease. The patient was advised to avoid driving a motor vehicle when drowsy.    An order was prepared today for a new APAP Respironics machine, and orders for all associated equipment.  Minimum pressure was 78 maximum pressure set at 10 cm of water.  This will allow for a slightly higher pressure to be used at times of allergies or with severe congestion.  This order will be held until we have the results from the [facility] site or a repeat diagnostic home sleep study is conducted.    Have advised the patient to follow up with the appropriate healthcare practitioners for all other medical problems and issues.    Mask wearing, handwashing, and social distancing protocols reviewed and encouraged.        Return for Annual visit.     Answers for  HPI/ROS submitted by the patient on 5/7/2021     NOTE: The patient is answering these questions  He feels on his current PAP therapy.  Year of your last physical exam: 2020  Results of exam: All okay except for elevated cholesterol  Occupation : /Supervisor  Height: 5’5&1/2”  Current weight: 177  6 months ago: 185  At age 21: 155  What is the reason for your visit today?: Check data on my CPAP, hasn’t been checked since 2016  Name of person referring you to the Sleep Center: Dr Domínguez, my GP  Have you ever been hospitalized?: Yes  Reason, year, and hospital in which you were hospitalized:: Many, last was 2014 fused C5-6-7  Have you ever had problems with anesthesia?: No  Have you experienced post-operative delirium?: No  Any complications with surgery?: No  What year did you receive your last Flu shot?: 2020  What year did you receive you last Pneumonia shot?: Never?  Have you had a TB skin test? If so, please list the year and result:: 50 years ago?  Have you had Allergy skin testing? If so, please list the year and result:: No  Please briefly describe your sleep problem and how old you were when it began.: Before the CPAP I would snore and wake myself up or wake up my wife  How does this affect your daily life and activities? Please also rate how serious of a problem this is (1 = Not at all, 10 = Very Serious).: I was tired  Have you had any previous evaluations, examinations, or treatment for this sleep problem or any other problems with your sleep? If so, please describe the evaluation, treatment, and results.: I was prescribed a CPAP back in 2015?  Have you used any medications (prescribed or otherwise) to help your sleep problem? If yes, include name, amount, frequency, and the prescribing physician.: No  If employed, what time do you usually start and end work?: 7am to 5pm  Do you ever change work shifts? If yes, describe how often (never, infrequently, regularly).: No  What time do you  usually go to bed and wake up on: Weekdays? Weekends?: 10 to 11pm, wake up ~6:30 to 7:30am  Do you have a regular bed partner?: Yes  How many minutes does it usually take to fall asleep at night after turning off the lights?: 20 minutes, occasionally +60 minutes ,  What do you ordinarily do just prior to turning out the lights and attempting to go to sleep (e.g., reading, TV, baths, etc.)?: Brush teeth and then watch TV in bed  On average, how many times do you wake up during the night?: 0 to 1  On average, how many times do you wake up to use the bathroom?: Mostly zero  Do you often wake up too early in the morning and are unable to return to sleep?: Rare occasions  On average, how many hours of sleep do you get per night?: +6hrs  How do you usually awaken?  Alarm, spontaneously, or other?: No alarm any more :-)  Is it difficult for you to awaken and get out of bed after sleeping? (Not at all, Sometimes, Very): Not at all  Do you nap or return to bed after arising?: No  Are you bothered by sleepiness during the day?: No  Do you feel that you get too much sleep at night?: No  Do you feel that you get too little sleep at night?: About right  Do you usually feel tired during the day? If so, what do you attribute this to?: No  Do you find yourself falling asleep when you don't mean to? : Not often  If yes, how long does your sleep episode last?: A few minutes  Do you feel rested or refreshed after the sleep episode?: No  Have you ever suddenly fallen?: No  Have you ever experienced sudden body weakness?: No  Have you ever experienced weakness or paralysis upon going to sleep?: No  Have you ever experienced weakness or paralysis upon awakening from sleep?: No  Have you ever experienced seeing things or hearing voices/noises: That weren't real? On going to sleep? During the night? On awakening from sleep? During the day?: No  Do you have difficulty breathing at night? If yes, briefly describe.: Not with the CPAP  Have  "you been told you snore while asleep? If so, does it disturb a bed partner (or someone in the same room), or someone in the next room?: Yes  Have you ever experienced doing something without being aware of the action? If yes, please describe.: No  Have you ever experienced upon lying in bed before sleep or on awakening from sleep: Restlessness of legs, \"nervous legs\", \"creeping crawling\" sensation of legs, or twitching of legs?: No  Have you ever been told that your arms or legs jerk or twitch while you are asleep? If yes, how many times per night does this occur?: No  Do you know, or have you ever been told that you do any of the following while sleeping: talk, walk, grit teeth, wet the bed, wake up screaming or seemingly afraid, have disturbing dreams, have unusual movements, wake up with headaches, (males) have erections? If yes to any of these, please indicate how many times per week, age started, last occurrence, treatment received.: Talk, grind teeth, erections,  Has anyone in your family been known to have any sleep problems? If yes, please list the type of problem (e.g., trouble getting to sleep, too sleepy, bed wetting, etc.), the relationship of this person to you, and the treatment received.: Older brother O2 was low 80’s when sleeping                        "

## 2021-05-13 ENCOUNTER — HOSPITAL ENCOUNTER (OUTPATIENT)
Dept: LAB | Facility: MEDICAL CENTER | Age: 66
End: 2021-05-13
Attending: FAMILY MEDICINE
Payer: COMMERCIAL

## 2021-05-13 DIAGNOSIS — R73.01 ELEVATED FASTING GLUCOSE: ICD-10-CM

## 2021-05-13 DIAGNOSIS — E78.5 DYSLIPIDEMIA: ICD-10-CM

## 2021-05-13 LAB
ALBUMIN SERPL BCP-MCNC: 4.2 G/DL (ref 3.2–4.9)
ALBUMIN/GLOB SERPL: 1.6 G/DL
ALP SERPL-CCNC: 56 U/L (ref 30–99)
ALT SERPL-CCNC: 26 U/L (ref 2–50)
ANION GAP SERPL CALC-SCNC: 9 MMOL/L (ref 7–16)
AST SERPL-CCNC: 19 U/L (ref 12–45)
BILIRUB SERPL-MCNC: 1.3 MG/DL (ref 0.1–1.5)
BUN SERPL-MCNC: 20 MG/DL (ref 8–22)
CALCIUM SERPL-MCNC: 8.8 MG/DL (ref 8.5–10.5)
CHLORIDE SERPL-SCNC: 106 MMOL/L (ref 96–112)
CHOLEST SERPL-MCNC: 156 MG/DL (ref 100–199)
CO2 SERPL-SCNC: 27 MMOL/L (ref 20–33)
CREAT SERPL-MCNC: 0.64 MG/DL (ref 0.5–1.4)
FASTING STATUS PATIENT QL REPORTED: NORMAL
GLOBULIN SER CALC-MCNC: 2.7 G/DL (ref 1.9–3.5)
GLUCOSE SERPL-MCNC: 99 MG/DL (ref 65–99)
HDLC SERPL-MCNC: 39 MG/DL
LDLC SERPL CALC-MCNC: 87 MG/DL
POTASSIUM SERPL-SCNC: 4.7 MMOL/L (ref 3.6–5.5)
PROT SERPL-MCNC: 6.9 G/DL (ref 6–8.2)
SODIUM SERPL-SCNC: 142 MMOL/L (ref 135–145)
TRIGL SERPL-MCNC: 151 MG/DL (ref 0–149)

## 2021-05-13 PROCEDURE — 83036 HEMOGLOBIN GLYCOSYLATED A1C: CPT

## 2021-05-13 PROCEDURE — 80053 COMPREHEN METABOLIC PANEL: CPT

## 2021-05-13 PROCEDURE — 80061 LIPID PANEL: CPT

## 2021-05-13 PROCEDURE — 36415 COLL VENOUS BLD VENIPUNCTURE: CPT

## 2021-05-14 LAB
EST. AVERAGE GLUCOSE BLD GHB EST-MCNC: 120 MG/DL
HBA1C MFR BLD: 5.8 % (ref 4–5.6)

## 2021-05-14 NOTE — RESULT ENCOUNTER NOTE
Released to [facility],  Your labs show mild elevation of A1c to 5.8.  This can be improved with reducing any sugary or starchy foods in the diet.  The triglyceride levels have been improving!  Liver kidney labs and electrolytes are all normal!  Don Rose M.D.

## 2021-08-13 ENCOUNTER — SLEEP CENTER VISIT (OUTPATIENT)
Dept: SLEEP MEDICINE | Facility: MEDICAL CENTER | Age: 66
End: 2021-08-13
Payer: COMMERCIAL

## 2021-08-13 VITALS
OXYGEN SATURATION: 96 % | HEART RATE: 93 BPM | WEIGHT: 180 LBS | HEIGHT: 66 IN | DIASTOLIC BLOOD PRESSURE: 66 MMHG | SYSTOLIC BLOOD PRESSURE: 124 MMHG | BODY MASS INDEX: 28.93 KG/M2

## 2021-08-13 DIAGNOSIS — G47.33 OBSTRUCTIVE SLEEP APNEA: ICD-10-CM

## 2021-08-13 PROCEDURE — 99213 OFFICE O/P EST LOW 20 MIN: CPT | Performed by: NURSE PRACTITIONER

## 2021-08-13 NOTE — PROGRESS NOTES
Chief Complaint   Patient presents with   • Follow-Up     CAROLIN-Last seen 05/10/2021       HPI:      Mr. Randall is a 64 y/o male patient who is in today for CAROLIN f/u. PMH includes dyslipidemia, BPH, CAROLIN, never smoker.     Received a new ResMed auto CPAP machine in May 2021.  First compliance report from 7/13/21-8/11/21 was downloaded and reviewed with the patient which showed autoCPAP 8-10 cmH2O, 100% compliance, 8 hrs 10 min use, AHI of 2.9. He is tolerating the pressure and mask well. He goes to bed between 11 pm-1 am and wakes up at 8:30 am.  He is planning to retire in October of this year and typically works Tuesday, Wednesday and Thursday during which time he tends to go to sleep between 10-11 PM and wake up around 6:30 AM.  He works as a .  He denies morning headache or snoring.  He stays active by walking or just staying busy throughout the day.  He denies any new health problems or medications.    Sleep Study History:   Patient completed a PSG study, and a PSG titration study at [facility] in [place], [place] and the results indicated on PSG study from 11/27/13 that the patient has CAROLIN with an AHI of 21.2.  The study showed no prolonged hypoxia, but there was transient desaturations with the lowest oxygen saturation of 81%.  The patient spent 2.2% of sleep time with oxygen saturation less than 89% as compared to a normal of 90% or above.    The PSG titration study from 12/13/13 indicated improvement of the respiratory events and hypoxia with positive airway pressure and the best improvement has been noticed at a pressure of 7 cm of water which decreased respiratory events to 0.9/h.  The lowest oxygen saturation during the study well and 7 cm of water pressure was 95%.    ROS:    Constitutional: Denies fevers, Denies weight changes  Eyes: Denies changes in vision, no eye pain  Ears/Nose/Throat/Mouth: Denies nasal congestion or sore throat   Cardiovascular: Denies chest pain or  palpitations   Respiratory: Denies shortness of breath , Denies cough  Gastrointestinal/Hepatic: Denies abdominal pain, nausea, vomiting,   Skin/Breast: Denies rash,   Neurological: Denies headache, confusion,   Psychiatric: denies mood disorder   Sleep: denies snoring       Past Medical History:   Diagnosis Date   • Back pain    • Chickenpox    • Hyperlipidemia    • Kidney stone    • Mumps    • Painful joint    • Ringing in ears    • Sleep apnea    • Tonsillitis    • Wears glasses        Past Surgical History:   Procedure Laterality Date   • HIP REPLACEMENT, TOTAL         Family History   Problem Relation Age of Onset   • Alzheimer's Disease Mother         Passed 2012   • Cancer Mother    • Breast Cancer Mother    • Sleep Apnea Brother    • Stroke Father        Social History     Socioeconomic History   • Marital status:      Spouse name: Not on file   • Number of children: Not on file   • Years of education: Not on file   • Highest education level: Not on file   Occupational History   • Not on file   Tobacco Use   • Smoking status: Never Smoker   • Smokeless tobacco: Never Used   Substance and Sexual Activity   • Alcohol use: Yes     Comment: 1 beer per day   • Drug use: No   • Sexual activity: Not on file   Other Topics Concern   • Not on file   Social History Narrative   • Not on file     Social Determinants of Health     Financial Resource Strain:    • Difficulty of Paying Living Expenses:    Food Insecurity:    • Worried About Running Out of Food in the Last Year:    • Ran Out of Food in the Last Year:    Transportation Needs:    • Lack of Transportation (Medical):    • Lack of Transportation (Non-Medical):    Physical Activity:    • Days of Exercise per Week:    • Minutes of Exercise per Session:    Stress:    • Feeling of Stress :    Social Connections:    • Frequency of Communication with Friends and Family:    • Frequency of Social Gatherings with Friends and Family:    • Attends Latter day Services:     • Active Member of Clubs or Organizations:    • Attends Club or Organization Meetings:    • Marital Status:    Intimate Partner Violence:    • Fear of Current or Ex-Partner:    • Emotionally Abused:    • Physically Abused:    • Sexually Abused:        Allergies as of 08/13/2021 - Reviewed 08/13/2021   Allergen Reaction Noted   • Pcn [penicillins] Rash 01/22/2018   • Skin adhesives  [mecrylate]  04/27/2021        Vitals:  Vitals:    08/13/21 1113   BP: 124/66   Pulse: 93   SpO2: 96%       Current medications as of today   Current Outpatient Medications   Medication Sig Dispense Refill   • diazePAM (VALIUM) 2 MG Tab diazepam 2 mg tablet   TAKE 1 TABLET BY MOUTH EVERY 6 HOURS AS NEEDED FOR ANXIETY FOR UP TO 90 DAYS     • Turmeric 500 MG Cap Take  by mouth.     • Saw Palmetto, Serenoa repens, (SAW PALMETTO PO) Take  by mouth.     • vitamin D (CHOLECALCIFEROL) 1000 Unit (25 mcg) Tab Take 1,000 Units by mouth every day.     • Multiple Vitamins-Minerals (ZINC PO) Take  by mouth.     • tamsulosin (FLOMAX) 0.4 MG capsule Take 1 Cap by mouth ONE-HALF HOUR AFTER BREAKFAST. 90 Cap 3   • atorvastatin (LIPITOR) 20 MG Tab Take 1 Tab by mouth every day. 90 Tab 3   • valacyclovir (VALTREX) 1 GM Tab Take 1 Tab by mouth 2 times a day. TAKE 2 TABLETS BY MOUTH TWICE DAILY FOR 2 DAYS ,  USE  AT  THE  FIRST  SIGN  OF  SYMPTOMS 20 Tab 5   • Coenzyme Q10 (CO Q-10) 300 MG Cap Take  by mouth.     • aspirin (ASA) 81 MG Chew Tab chewable tablet Take 81 mg by mouth every day.     • glucosamine Sulfate 500 MG Cap Take 500 mg by mouth every day.     • Omega-3 Fatty Acids (FISH OIL) 1000 MG Cap capsule Take 1,000 mg by mouth 3 times a day, with meals.       No current facility-administered medications for this visit.         Physical Exam: Limited by COVID-19 precautions.  Appearance: Well developed, well nourished, no acute distress  Eyes: PERRL, EOM intact, sclera white, conjunctiva moist  Ears: no lesions or deformities  Hearing: grossly  intact  Nose: no lesions or deformities  Respiratory effort: no intercostal retractions or use of accessory muscles  Extremities: no cyanosis or edema  Abdomen: soft   Gait and Station: normal  Digits and nails: no clubbing, cyanosis, petechiae or nodes.  Cranial nerves: grossly intact  Skin: no visible rashes, lesions or ulcers noted  Orientation: Oriented to time, person and place  Mood and affect: mood and affect appropriate, normal interaction with examiner  Judgement: Intact    Assessment:  1. Obstructive sleep apnea     2. BMI 29.0-29.9,adult           Plan  Discussed the cardiovascular and neuropsychiatric risks of untreated CAROLIN; including but not limited to: HTN, DM, MI, ASCVD, CVA, CHF, traffic accidents.     1. First compliance report from 7/13/21-8/11/21 was downloaded and reviewed with the patient which showed autoCPAP 8-10 cmH2O, 100% compliance, 8 hrs 10 min use, AHI of 2.9. Continue autoCPAP. Patient is compliant and benefiting from autoCPAP therapy for management of CAROLIN. Advised patient to continue to use the CPAP every night for more than four hours for optimal health benefit and to meet the health insurance 70% compliance guideline.     *DME order ([facility]) for mask (nasal mask or MOC) and supplies was provided today. Continue to clean mask and supplies weekly with soap and water, and change supplies per insurance guidelines.     2. Continue to stay active.  3. Sleep hygiene discussed. Recommend keeping a set sleep/wake schedule. Logging enough hours of sleep. Limiting/Avoiding naps. No caffeine after noon and no heavy meals in the evening.   4. Follow up with the appropriate healthcare practitioners for all other medical problems.  5. F/u in 1 year for CAROLIN, sooner if needed.       CHENCHO Mcgee.      This dictation was created using voice recognition software. The accuracy of the dictation is limited to the abilities of the software. I expect there may be some errors of grammar and possibly  content.

## 2021-12-06 DIAGNOSIS — E78.5 DYSLIPIDEMIA: ICD-10-CM

## 2021-12-06 DIAGNOSIS — N40.0 ENLARGED PROSTATE: ICD-10-CM

## 2021-12-07 RX ORDER — ATORVASTATIN CALCIUM 20 MG/1
TABLET, FILM COATED ORAL
Qty: 90 TABLET | Refills: 3 | Status: SHIPPED | OUTPATIENT
Start: 2021-12-07 | End: 2022-07-14 | Stop reason: SDUPTHER

## 2021-12-07 RX ORDER — TAMSULOSIN HYDROCHLORIDE 0.4 MG/1
CAPSULE ORAL
Qty: 90 CAPSULE | Refills: 3 | Status: SHIPPED | OUTPATIENT
Start: 2021-12-07 | End: 2022-07-14 | Stop reason: SDUPTHER

## 2021-12-07 NOTE — TELEPHONE ENCOUNTER
Received request via: Pharmacy    Was the patient seen in the last year in this department? Yes    Does the patient have an active prescription (recently filled or refills available) for medication(s) requested? No     Last OV 04/27/2021

## 2022-07-10 ENCOUNTER — OFFICE VISIT (OUTPATIENT)
Dept: URGENT CARE | Facility: PHYSICIAN GROUP | Age: 67
End: 2022-07-10
Payer: MEDICARE

## 2022-07-10 VITALS
BODY MASS INDEX: 29.09 KG/M2 | RESPIRATION RATE: 16 BRPM | SYSTOLIC BLOOD PRESSURE: 140 MMHG | WEIGHT: 181 LBS | HEART RATE: 82 BPM | OXYGEN SATURATION: 95 % | DIASTOLIC BLOOD PRESSURE: 78 MMHG | HEIGHT: 66 IN | TEMPERATURE: 97 F

## 2022-07-10 DIAGNOSIS — U07.1 COVID-19 VIRUS INFECTION: ICD-10-CM

## 2022-07-10 PROCEDURE — 99213 OFFICE O/P EST LOW 20 MIN: CPT | Performed by: FAMILY MEDICINE

## 2022-07-10 NOTE — PROGRESS NOTES
Chief Complaint:    Chief Complaint   Patient presents with   • Coronavirus Screening     At home positive test, would like treatment, fatigue, body aches, sore throat, cough, congestion, sweats. All symptoms x3 days        History of Present Illness:    Covid symptoms started 7/7/22, had positive home test yesterday, does not want test here, would like treatment.      Past Medical History:    Past Medical History:   Diagnosis Date   • Back pain    • Chickenpox    • Hyperlipidemia    • Kidney stone    • Mumps    • Painful joint    • Ringing in ears    • Sleep apnea    • Tonsillitis    • Wears glasses      Past Surgical History:    Past Surgical History:   Procedure Laterality Date   • HIP REPLACEMENT, TOTAL       Social History:    Social History     Socioeconomic History   • Marital status:      Spouse name: Not on file   • Number of children: Not on file   • Years of education: Not on file   • Highest education level: Not on file   Occupational History   • Not on file   Tobacco Use   • Smoking status: Never Smoker   • Smokeless tobacco: Never Used   Substance and Sexual Activity   • Alcohol use: Yes     Comment: 1 beer per day   • Drug use: No   • Sexual activity: Not on file   Other Topics Concern   • Not on file   Social History Narrative   • Not on file     Social Determinants of Health     Financial Resource Strain: Not on file   Food Insecurity: Not on file   Transportation Needs: Not on file   Physical Activity: Not on file   Stress: Not on file   Social Connections: Not on file   Intimate Partner Violence: Not on file   Housing Stability: Not on file     Family History:    Family History   Problem Relation Age of Onset   • Alzheimer's Disease Mother         Passed 2012   • Cancer Mother    • Breast Cancer Mother    • Sleep Apnea Brother    • Stroke Father      Medications:    Current Outpatient Medications on File Prior to Visit   Medication Sig Dispense Refill   • tamsulosin (FLOMAX) 0.4 MG capsule  "TAKE 1 CAPSULE 1/2 HOUR AFTER BREAKFAST 90 Capsule 3   • atorvastatin (LIPITOR) 20 MG Tab TAKE 1 TABLET DAILY 90 Tablet 3   • diazePAM (VALIUM) 2 MG Tab diazepam 2 mg tablet   TAKE 1 TABLET BY MOUTH EVERY 6 HOURS AS NEEDED FOR ANXIETY FOR UP TO 90 DAYS     • Turmeric 500 MG Cap Take  by mouth.     • Saw Palmetto, Serenoa repens, (SAW PALMETTO PO) Take  by mouth.     • vitamin D (CHOLECALCIFEROL) 1000 Unit (25 mcg) Tab Take 1,000 Units by mouth every day.     • Multiple Vitamins-Minerals (ZINC PO) Take  by mouth.     • valacyclovir (VALTREX) 1 GM Tab Take 1 Tab by mouth 2 times a day. TAKE 2 TABLETS BY MOUTH TWICE DAILY FOR 2 DAYS ,  USE  AT  THE  FIRST  SIGN  OF  SYMPTOMS 20 Tab 5   • Coenzyme Q10 (CO Q-10) 300 MG Cap Take  by mouth.     • aspirin (ASA) 81 MG Chew Tab chewable tablet Take 81 mg by mouth every day.     • glucosamine Sulfate 500 MG Cap Take 500 mg by mouth every day.     • Omega-3 Fatty Acids (FISH OIL) 1000 MG Cap capsule Take 1,000 mg by mouth 3 times a day, with meals.       No current facility-administered medications on file prior to visit.     Allergies:    Allergies   Allergen Reactions   • Pcn [Penicillins] Rash     Rash   • Skin Adhesives  [Mecrylate]        Vitals:    Vitals:    07/10/22 0953   BP: (!) 140/78   Pulse: 82   Resp: 16   Temp: 36.1 °C (97 °F)   TempSrc: Temporal   SpO2: 95%   Weight: 82.1 kg (181 lb)   Height: 1.676 m (5' 6\")       Physical Exam:    Constitutional: Vital signs reviewed. Appears well-developed and well-nourished. No acute distress.   Eyes: Sclera white, conjunctivae clear.   ENT: External ears normal. Hearing normal.   Neck: Neck supple.   Pulmonary/Chest: Respirations non-labored.   Musculoskeletal: Normal gait. No muscular atrophy or weakness.  Neurological: Alert and oriented to person, place, and time. Muscle tone normal. Coordination normal.   Skin: No rashes or lesions. Warm, dry, normal turgor.  Psychiatric: Normal mood and affect. Behavior is normal. " Judgment and thought content normal.       Medical Decision Makin. COVID-19 virus infection  - Nirmatrelvir & Ritonavir 20 x 150 MG & 10 x 100MG Tablet Therapy Pack; Take 300 mg nirmatrelvir (two 150 mg tablets) with 100 mg ritonavir (one 100 mg tablet) by mouth, with all three tablets taken together twice daily for 5 days.  Dispense: 30 Each; Refill: 0      Discussed with him DDX, management options, and risks, benefits, and alternatives to treatment plan agreed upon.    Covid symptoms started 22, had positive home test yesterday, does not want test here, would like treatment.    Discussed Paxlovid treatment - qualifies due to age and overweight, and possibility of Paxlovid rebound. He would like.    May use OTC meds for symptoms as needed.    Agreeable to medication prescribed.    Discussed expected course of duration, time for improvement, and to seek follow-up in Emergency Room, urgent care, or with PCP if getting worse at any time or not improving within expected time frame.

## 2022-07-14 DIAGNOSIS — E78.5 DYSLIPIDEMIA: ICD-10-CM

## 2022-07-14 DIAGNOSIS — N40.0 ENLARGED PROSTATE: ICD-10-CM

## 2022-07-14 RX ORDER — ATORVASTATIN CALCIUM 20 MG/1
20 TABLET, FILM COATED ORAL DAILY
Qty: 90 TABLET | Refills: 3 | Status: SHIPPED | OUTPATIENT
Start: 2022-07-14

## 2022-07-14 RX ORDER — TAMSULOSIN HYDROCHLORIDE 0.4 MG/1
0.4 CAPSULE ORAL
Qty: 90 CAPSULE | Refills: 3 | Status: SHIPPED | OUTPATIENT
Start: 2022-07-14 | End: 2023-06-26 | Stop reason: SDUPTHER

## 2022-07-14 NOTE — TELEPHONE ENCOUNTER
Received request via: Patient    Was the patient seen in the last year in this department? Yes    Does the patient have an active prescription (recently filled or refills available) for medication(s) requested? No     Next appt 8/10/22

## 2022-08-08 ENCOUNTER — TELEPHONE (OUTPATIENT)
Dept: URGENT CARE | Facility: PHYSICIAN GROUP | Age: 67
End: 2022-08-08
Payer: MEDICARE

## 2022-08-09 DIAGNOSIS — Z12.5 SCREENING FOR MALIGNANT NEOPLASM OF PROSTATE: ICD-10-CM

## 2022-08-09 DIAGNOSIS — Q78.6 MULTIPLE OSTEOCHONDROMA: ICD-10-CM

## 2022-08-09 DIAGNOSIS — R73.01 ELEVATED FASTING GLUCOSE: ICD-10-CM

## 2022-08-09 DIAGNOSIS — E78.5 DYSLIPIDEMIA: ICD-10-CM

## 2022-08-09 DIAGNOSIS — E55.9 VITAMIN D DEFICIENCY: ICD-10-CM

## 2022-08-10 ENCOUNTER — OFFICE VISIT (OUTPATIENT)
Dept: MEDICAL GROUP | Facility: PHYSICIAN GROUP | Age: 67
End: 2022-08-10
Payer: MEDICARE

## 2022-08-10 ENCOUNTER — APPOINTMENT (OUTPATIENT)
Dept: RADIOLOGY | Facility: IMAGING CENTER | Age: 67
End: 2022-08-10
Attending: FAMILY MEDICINE
Payer: MEDICARE

## 2022-08-10 ENCOUNTER — NON-PROVIDER VISIT (OUTPATIENT)
Dept: URGENT CARE | Facility: PHYSICIAN GROUP | Age: 67
End: 2022-08-10
Payer: MEDICARE

## 2022-08-10 VITALS
WEIGHT: 180 LBS | HEART RATE: 75 BPM | RESPIRATION RATE: 16 BRPM | HEIGHT: 66 IN | OXYGEN SATURATION: 97 % | BODY MASS INDEX: 28.93 KG/M2 | DIASTOLIC BLOOD PRESSURE: 72 MMHG | SYSTOLIC BLOOD PRESSURE: 128 MMHG | TEMPERATURE: 97.5 F

## 2022-08-10 DIAGNOSIS — L98.9 SKIN LESION: ICD-10-CM

## 2022-08-10 DIAGNOSIS — M25.552 BILATERAL HIP PAIN: ICD-10-CM

## 2022-08-10 DIAGNOSIS — Z96.643 HISTORY OF TOTAL REPLACEMENT OF BOTH HIP JOINTS: ICD-10-CM

## 2022-08-10 DIAGNOSIS — M25.549 PAIN IN FINGER JOINT ON MOVEMENT: ICD-10-CM

## 2022-08-10 DIAGNOSIS — Z98.1 S/P CERVICAL SPINAL FUSION: ICD-10-CM

## 2022-08-10 DIAGNOSIS — M25.551 BILATERAL HIP PAIN: ICD-10-CM

## 2022-08-10 DIAGNOSIS — Q78.6 MULTIPLE OSTEOCHONDROMA: ICD-10-CM

## 2022-08-10 DIAGNOSIS — N40.0 BENIGN PROSTATIC HYPERPLASIA WITHOUT LOWER URINARY TRACT SYMPTOMS: ICD-10-CM

## 2022-08-10 DIAGNOSIS — G47.33 OSA ON CPAP: ICD-10-CM

## 2022-08-10 DIAGNOSIS — K63.5 BENIGN COLONIC POLYP: ICD-10-CM

## 2022-08-10 DIAGNOSIS — M85.621 BONE CYST OF RIGHT HUMERUS: ICD-10-CM

## 2022-08-10 DIAGNOSIS — E78.5 DYSLIPIDEMIA: ICD-10-CM

## 2022-08-10 DIAGNOSIS — L57.0 ACTINIC KERATOSIS: ICD-10-CM

## 2022-08-10 PROCEDURE — G0402 INITIAL PREVENTIVE EXAM: HCPCS | Performed by: FAMILY MEDICINE

## 2022-08-10 PROCEDURE — 73120 X-RAY EXAM OF HAND: CPT | Mod: TC,FY,LT | Performed by: NURSE PRACTITIONER

## 2022-08-10 PROCEDURE — 73522 X-RAY EXAM HIPS BI 3-4 VIEWS: CPT | Mod: TC,FY | Performed by: NURSE PRACTITIONER

## 2022-08-10 RX ORDER — MULTIVIT WITH MINERALS/LUTEIN
TABLET ORAL
COMMUNITY
Start: 2020-01-10

## 2022-08-10 RX ORDER — EZETIMIBE 10 MG/1
10 TABLET ORAL DAILY
Qty: 90 TABLET | Refills: 3 | Status: SHIPPED | OUTPATIENT
Start: 2022-08-10 | End: 2023-11-02

## 2022-08-10 SDOH — ECONOMIC STABILITY: TRANSPORTATION INSECURITY
IN THE PAST 12 MONTHS, HAS THE LACK OF TRANSPORTATION KEPT YOU FROM MEDICAL APPOINTMENTS OR FROM GETTING MEDICATIONS?: NO

## 2022-08-10 SDOH — ECONOMIC STABILITY: INCOME INSECURITY: IN THE LAST 12 MONTHS, WAS THERE A TIME WHEN YOU WERE NOT ABLE TO PAY THE MORTGAGE OR RENT ON TIME?: NO

## 2022-08-10 SDOH — ECONOMIC STABILITY: FOOD INSECURITY: WITHIN THE PAST 12 MONTHS, THE FOOD YOU BOUGHT JUST DIDN'T LAST AND YOU DIDN'T HAVE MONEY TO GET MORE.: NEVER TRUE

## 2022-08-10 SDOH — ECONOMIC STABILITY: HOUSING INSECURITY
IN THE LAST 12 MONTHS, WAS THERE A TIME WHEN YOU DID NOT HAVE A STEADY PLACE TO SLEEP OR SLEPT IN A SHELTER (INCLUDING NOW)?: NO

## 2022-08-10 SDOH — ECONOMIC STABILITY: INCOME INSECURITY: HOW HARD IS IT FOR YOU TO PAY FOR THE VERY BASICS LIKE FOOD, HOUSING, MEDICAL CARE, AND HEATING?: NOT VERY HARD

## 2022-08-10 SDOH — ECONOMIC STABILITY: TRANSPORTATION INSECURITY
IN THE PAST 12 MONTHS, HAS LACK OF TRANSPORTATION KEPT YOU FROM MEETINGS, WORK, OR FROM GETTING THINGS NEEDED FOR DAILY LIVING?: NO

## 2022-08-10 SDOH — ECONOMIC STABILITY: FOOD INSECURITY: WITHIN THE PAST 12 MONTHS, YOU WORRIED THAT YOUR FOOD WOULD RUN OUT BEFORE YOU GOT MONEY TO BUY MORE.: NEVER TRUE

## 2022-08-10 SDOH — ECONOMIC STABILITY: HOUSING INSECURITY: IN THE LAST 12 MONTHS, HOW MANY PLACES HAVE YOU LIVED?: 1

## 2022-08-10 SDOH — HEALTH STABILITY: PHYSICAL HEALTH: ON AVERAGE, HOW MANY DAYS PER WEEK DO YOU ENGAGE IN MODERATE TO STRENUOUS EXERCISE (LIKE A BRISK WALK)?: 3 DAYS

## 2022-08-10 SDOH — HEALTH STABILITY: PHYSICAL HEALTH: ON AVERAGE, HOW MANY MINUTES DO YOU ENGAGE IN EXERCISE AT THIS LEVEL?: 20 MIN

## 2022-08-10 SDOH — ECONOMIC STABILITY: TRANSPORTATION INSECURITY
IN THE PAST 12 MONTHS, HAS LACK OF RELIABLE TRANSPORTATION KEPT YOU FROM MEDICAL APPOINTMENTS, MEETINGS, WORK OR FROM GETTING THINGS NEEDED FOR DAILY LIVING?: NO

## 2022-08-10 SDOH — HEALTH STABILITY: MENTAL HEALTH
STRESS IS WHEN SOMEONE FEELS TENSE, NERVOUS, ANXIOUS, OR CAN'T SLEEP AT NIGHT BECAUSE THEIR MIND IS TROUBLED. HOW STRESSED ARE YOU?: NOT AT ALL

## 2022-08-10 ASSESSMENT — SOCIAL DETERMINANTS OF HEALTH (SDOH)
HOW OFTEN DO YOU HAVE SIX OR MORE DRINKS ON ONE OCCASION: NEVER
HOW OFTEN DO YOU GET TOGETHER WITH FRIENDS OR RELATIVES?: MORE THAN THREE TIMES A WEEK
IN A TYPICAL WEEK, HOW MANY TIMES DO YOU TALK ON THE PHONE WITH FAMILY, FRIENDS, OR NEIGHBORS?: MORE THAN THREE TIMES A WEEK
HOW OFTEN DO YOU ATTEND CHURCH OR RELIGIOUS SERVICES?: NEVER
HOW OFTEN DO YOU ATTENT MEETINGS OF THE CLUB OR ORGANIZATION YOU BELONG TO?: 1 TO 4 TIMES PER YEAR
HOW OFTEN DO YOU ATTENT MEETINGS OF THE CLUB OR ORGANIZATION YOU BELONG TO?: 1 TO 4 TIMES PER YEAR
DO YOU BELONG TO ANY CLUBS OR ORGANIZATIONS SUCH AS CHURCH GROUPS UNIONS, FRATERNAL OR ATHLETIC GROUPS, OR SCHOOL GROUPS?: NO
IN A TYPICAL WEEK, HOW MANY TIMES DO YOU TALK ON THE PHONE WITH FAMILY, FRIENDS, OR NEIGHBORS?: MORE THAN THREE TIMES A WEEK
HOW OFTEN DO YOU ATTEND CHURCH OR RELIGIOUS SERVICES?: NEVER
WITHIN THE PAST 12 MONTHS, YOU WORRIED THAT YOUR FOOD WOULD RUN OUT BEFORE YOU GOT THE MONEY TO BUY MORE: NEVER TRUE
DO YOU BELONG TO ANY CLUBS OR ORGANIZATIONS SUCH AS CHURCH GROUPS UNIONS, FRATERNAL OR ATHLETIC GROUPS, OR SCHOOL GROUPS?: NO
HOW HARD IS IT FOR YOU TO PAY FOR THE VERY BASICS LIKE FOOD, HOUSING, MEDICAL CARE, AND HEATING?: NOT VERY HARD
HOW OFTEN DO YOU HAVE A DRINK CONTAINING ALCOHOL: 2-3 TIMES A WEEK
HOW OFTEN DO YOU GET TOGETHER WITH FRIENDS OR RELATIVES?: MORE THAN THREE TIMES A WEEK
HOW MANY DRINKS CONTAINING ALCOHOL DO YOU HAVE ON A TYPICAL DAY WHEN YOU ARE DRINKING: 1 OR 2

## 2022-08-10 ASSESSMENT — LIFESTYLE VARIABLES
HOW MANY STANDARD DRINKS CONTAINING ALCOHOL DO YOU HAVE ON A TYPICAL DAY: 1 OR 2
SKIP TO QUESTIONS 9-10: 1
HOW OFTEN DO YOU HAVE A DRINK CONTAINING ALCOHOL: 2-3 TIMES A WEEK
HOW OFTEN DO YOU HAVE SIX OR MORE DRINKS ON ONE OCCASION: NEVER
AUDIT-C TOTAL SCORE: 3

## 2022-08-10 ASSESSMENT — ACTIVITIES OF DAILY LIVING (ADL): BATHING_REQUIRES_ASSISTANCE: 0

## 2022-08-10 ASSESSMENT — ENCOUNTER SYMPTOMS: GENERAL WELL-BEING: GOOD

## 2022-08-10 ASSESSMENT — PATIENT HEALTH QUESTIONNAIRE - PHQ9: CLINICAL INTERPRETATION OF PHQ2 SCORE: 0

## 2022-08-10 NOTE — ASSESSMENT & PLAN NOTE
Fasting labs pending. Pt is on atorvastatin for years.   He would like to try off the statin due to concern it is interfering with ejaculation.

## 2022-08-10 NOTE — ASSESSMENT & PLAN NOTE
History of osteochondromas of left and right humerus, He has some problems with with bones lately. He needs ref to orthopedic specialist experienced in hip replacement revisions.  He had two b/l hip replacements. When he lifts too much he gets discomforts and needs to rest.   Needs evaluation by ortho.   B/l hip xrays done.   He was seen by his orthopedic surgeon Dr. Patino at [facility] in [place] and the hips were stable on xrays. On first hip replacement, he has leakage of material down his right femur.

## 2022-08-10 NOTE — PROGRESS NOTES
Chief Complaint   Patient presents with    Annual Exam     Welcome to medicare    I attest that I saw this patient on this date and due to technical issues am not showing as the author of the note.    Don Rose M.D.    HPI:  Amadeo Randall is a 66 y.o. here for Medicare Annual Wellness Visit     Patient Active Problem List    Diagnosis Date Noted    Multiple osteochondroma 12/02/2020    Bone cyst of right humerus 10/20/2020    Skin lesion 06/11/2018    Dyslipidemia 02/27/2018    Benign prostatic hyperplasia without lower urinary tract symptoms 02/27/2018    S/P cervical spinal fusion 02/27/2018    History of total replacement of both hip joints 02/27/2018    CAROLIN on CPAP 02/27/2018    Benign colonic polyp 05/06/2014    Actinic keratosis 06/20/2011       Current Outpatient Medications   Medication Sig Dispense Refill    Ascorbic Acid (VITAMIN C) 1000 MG Tab       ezetimibe (ZETIA) 10 MG Tab Take 1 Tablet by mouth every day. 90 Tablet 3    atorvastatin (LIPITOR) 20 MG Tab Take 1 Tablet by mouth every day. 90 Tablet 3    tamsulosin (FLOMAX) 0.4 MG capsule Take 1 Capsule by mouth 1/2 hour after breakfast. 90 Capsule 3    diazePAM (VALIUM) 2 MG Tab diazepam 2 mg tablet   TAKE 1 TABLET BY MOUTH EVERY 6 HOURS AS NEEDED FOR ANXIETY FOR UP TO 90 DAYS      Turmeric 500 MG Cap Take 1,500 mg by mouth.      Saw Palmetto, Serenoa repens, (SAW PALMETTO PO) Take 1,000 mg by mouth. Takes 2 times daily      vitamin D (CHOLECALCIFEROL) 1000 Unit (25 mcg) Tab Take 5,000 Units by mouth every day. Takes 2 times daily      Multiple Vitamins-Minerals (ZINC PO) Take  by mouth.      valacyclovir (VALTREX) 1 GM Tab Take 1 Tab by mouth 2 times a day. TAKE 2 TABLETS BY MOUTH TWICE DAILY FOR 2 DAYS ,  USE  AT  THE  FIRST  SIGN  OF  SYMPTOMS 20 Tab 5    Coenzyme Q10 (CO Q-10) 300 MG Cap Take  by mouth.      aspirin (ASA) 81 MG Chew Tab chewable tablet Take 81 mg by mouth every day.      glucosamine Sulfate 500 MG Cap Take 500 mg by mouth every  day.      Omega-3 Fatty Acids (FISH OIL) 1000 MG Cap capsule Take 1,000 mg by mouth 3 times a day, with meals.       No current facility-administered medications for this visit.          Current supplements as per medication list.     Allergies: Pcn [penicillins] and Skin adhesives  [mecrylate]    Current social contact/activities: off roading group and recovery      He  reports that he has never smoked. He has never used smokeless tobacco. He reports current alcohol use. He reports that he does not use drugs.  Counseling given: Not Answered      DPA/Advanced Directive:  Patient has Advanced Directive, but it is not on file. Instructed to bring in a copy to scan into their chart.    ROS:    Gait: Uses no assistive device  Ostomy: no  Other tubes: No  Amputations: No  Chronic oxygen use: No  Last eye exam: 3/2021  Wears hearing aids: No   : Denies any urinary leakage during the last 6 months    Screening:    Depression Screening  Little interest or pleasure in doing things?  0 - not at all  Feeling down, depressed , or hopeless? 0 - not at all  Patient Health Questionnaire Score: 0     If depressive symptoms identified deferred to follow up visit unless specifically addressed in assessment and plan.    Interpretation of PHQ-9 Total Score   Score Severity   1-4 No Depression   5-9 Mild Depression   10-14 Moderate Depression   15-19 Moderately Severe Depression   20-27 Severe Depression    Screening for Cognitive Impairment  Three Minute Recall (daughter, heaven, mountain) 2/3    [place] clock face with all 12 numbers and set the hands to show 10 past 11.  Yes    Cognitive concerns identified deferred for follow up unless specifically addressed in assessment and plan.    Fall Risk Assessment  Has the patient had two or more falls in the last year or any fall with injury in the last year?  No    Safety Assessment  Throw rugs on floor.  Yes  Handrails on all stairs.  Yes  Good lighting in all hallways.  Yes  Difficulty  hearing.  No  Patient counseled about all safety risks that were identified.    Functional Assessment ADLs  Are there any barriers preventing you from cooking for yourself or meeting nutritional needs?  No.    Are there any barriers preventing you from driving safely or obtaining transportation?  No.    Are there any barriers preventing you from using a telephone or calling for help?  No.    Are there any barriers preventing you from shopping?  No.    Are there any barriers preventing you from taking care of your own finances?  No.    Are there any barriers preventing you from managing your medications?  No.    Are there any barriers preventing you from showering, bathing or dressing yourself?  No.    Are you currently engaging in any exercise or physical activity?  Yes.  Walking and standing all day long   What is your perception of your health?  Good.      Health Maintenance Summary            Overdue - IMM HEP B VACCINE (1 of 3 - 3-dose series) Overdue - never done      No completion history exists for this topic.              Overdue - COVID-19 Vaccine (2 - Moderna series) Overdue since 7/30/2021 07/02/2021  Imm Admin: Moderna SARS-CoV-2 Vaccine             Overdue - IMM PNEUMOCOCCAL VACCINE: 65+ Years (1 - PCV) Overdue - never done      No completion history exists for this topic.              IMM INFLUENZA (1) Next due on 9/1/2022      10/06/2021  Imm Admin: Influenza Vaccine Quad Recombinant     10/23/2020  Imm Admin: Influenza Vaccine Quad Recombinant     10/25/2019  Imm Admin: Influenza Vaccine Quad Inj (Pf)     11/05/2018  Imm Admin: Influenza Vaccine Quad Inj (Pf)     01/19/2018  Imm Admin: Influenza Vaccine Quad Inj (Pf)             COLORECTAL CANCER SCREENING (COLONOSCOPY - Every 10 Years) Next due on 5/23/2024 05/23/2014  COLONOSCOPY (Done - [facility])             IMM ZOSTER VACCINES (Series Information) Completed      10/25/2019  Imm Admin: Zoster Vaccine Recombinant (RZV) (SHINGRIX)  "    07/22/2019  Imm Admin: Zoster Vaccine Recombinant (RZV) (SHINGRIX)             HEPATITIS C SCREENING  Completed      10/14/2020  HEP C VIRUS ANTIBODY             Annual Wellness Visit  Completed      08/10/2022  Initial Annual Wellness Visit - Includes PPPS ()             IMM MENINGOCOCCAL ACWY VACCINE (Series Information) Aged Out      No completion history exists for this topic.              Discontinued - IMM DTaP/Tdap/Td Vaccine  Discontinued      07/22/2019  Imm Admin: Tdap Vaccine                   Patient Care Team:  Don Rose M.D. as PCP - General (Family Medicine)  PREFERRED (DME Supplier)        Social History     Tobacco Use    Smoking status: Never    Smokeless tobacco: Never   Vaping Use    Vaping Use: Never used   Substance Use Topics    Alcohol use: Yes     Comment: 1 beer per day    Drug use: No     Family History   Problem Relation Age of Onset    Alzheimer's Disease Mother         Passed 2012    Cancer Mother     Breast Cancer Mother     Sleep Apnea Brother     Stroke Father      He  has a past medical history of Back pain, Chickenpox, Hyperlipidemia, Kidney stone, Mumps, Painful joint, Ringing in ears, Sleep apnea, Tonsillitis, and Wears glasses.   Past Surgical History:   Procedure Laterality Date    HIP REPLACEMENT, TOTAL         Exam:   /72 (BP Location: Left arm, Patient Position: Sitting, BP Cuff Size: Small adult)   Pulse 75   Temp 36.4 °C (97.5 °F) (Temporal)   Resp 16   Ht 1.664 m (5' 5.5\")   Wt 81.6 kg (180 lb)   SpO2 97%  Body mass index is 29.5 kg/m².    Hearing good.    Dentition good  Alert, oriented in no acute distress.  Eye contact is good, speech goal directed, affect calm    Assessment and Plan. The following treatment and monitoring plan is recommended:    CAROLIN on CPAP  Continues on cpap without oxygen and with good energy levels.     Skin lesion  He had actinic keratoses,   Has cryotherapy done by dermatology  Encouraged to follow up with dermatology " annually.     S/P cervical spinal fusion  Doing ok, hx of spinal cervical fusion.   He takes diazepam sparingly for occasional severe muscle spasms.   Refill provided. rx 30 tabs will last about a year.   Muscle relaxants have not been helpful and give side effects like a hang over.   Reviewed side effects.       Multiple osteochondroma  History of osteochondromas of left and right humerus, He has some problems with with bones lately. He needs ref to orthopedic specialist experienced in hip replacement revisions.  He had two b/l hip replacements. When he lifts too much he gets discomforts and needs to rest.   Needs evaluation by ortho.   B/l hip xrays done.   He was seen by his orthopedic surgeon Dr. Patino at [facility] in [place] and the hips were stable on xrays. On first hip replacement, he has leakage of material down his right femur.       History of total replacement of both hip joints  xrays ordered.   Ref to ortho provided.     Dyslipidemia  Fasting labs pending. Pt is on atorvastatin for years.   He would like to try off the statin due to concern it is interfering with ejaculation.    Bone cyst of right humerus  Ref to ortho provided. Hip replacement surgeries in the past. Now has some pain.     Benign prostatic hyperplasia without lower urinary tract symptoms  Pt is on flomax, relatively with no problems.   He has small amounts when urinating.       Benign colonic polyp  He has no blood in the stool and up to date on his colonoscopies.     Actinic keratosis  Encouraged to follow up annually with dermatology.      Services suggested: ref to ortho provided.   Health Care Screening: Age-appropriate preventive services recommended by USPTF and ACIP covered by Medicare were discussed today. Services ordered if indicated and agreed upon by the patient.  Referrals offered: Community-based lifestyle interventions to reduce health risks and promote self-management and wellness, fall prevention, nutrition,  physical activity, tobacco-use cessation, weight loss, and mental health services as per orders if indicated.    Discussion today about general wellness and lifestyle habits:    Prevent falls and reduce trip hazards; Cautioned about securing or removing rugs.  Have a working fire alarm and carbon monoxide detector;   Engage in regular physical activity and social activities     Follow-up: Return in about 3 months (around 11/10/2022) for Lab Review, left hand pain.

## 2022-08-10 NOTE — ASSESSMENT & PLAN NOTE
Doing ok, hx of spinal cervical fusion.   He takes diazepam sparingly for occasional severe muscle spasms.   Refill provided. rx 30 tabs will last about a year.   Muscle relaxants have not been helpful and give side effects like a hang over.   Reviewed side effects.

## 2022-08-10 NOTE — ASSESSMENT & PLAN NOTE
He had actinic keratoses,   Has cryotherapy done by dermatology  Encouraged to follow up with dermatology annually.

## 2022-08-11 ENCOUNTER — HOSPITAL ENCOUNTER (OUTPATIENT)
Dept: LAB | Facility: MEDICAL CENTER | Age: 67
End: 2022-08-11
Attending: FAMILY MEDICINE
Payer: MEDICARE

## 2022-08-11 DIAGNOSIS — Q78.6 MULTIPLE OSTEOCHONDROMA: ICD-10-CM

## 2022-08-11 DIAGNOSIS — Z12.5 SCREENING FOR MALIGNANT NEOPLASM OF PROSTATE: ICD-10-CM

## 2022-08-11 DIAGNOSIS — E55.9 VITAMIN D DEFICIENCY: ICD-10-CM

## 2022-08-11 DIAGNOSIS — E78.5 DYSLIPIDEMIA: ICD-10-CM

## 2022-08-11 DIAGNOSIS — R73.01 ELEVATED FASTING GLUCOSE: ICD-10-CM

## 2022-08-11 LAB
25(OH)D3 SERPL-MCNC: 66 NG/ML (ref 30–100)
ALBUMIN SERPL BCP-MCNC: 4.5 G/DL (ref 3.2–4.9)
ALBUMIN/GLOB SERPL: 1.7 G/DL
ALP SERPL-CCNC: 57 U/L (ref 30–99)
ALT SERPL-CCNC: 19 U/L (ref 2–50)
ANION GAP SERPL CALC-SCNC: 9 MMOL/L (ref 7–16)
AST SERPL-CCNC: 16 U/L (ref 12–45)
BASOPHILS # BLD AUTO: 0.2 % (ref 0–1.8)
BASOPHILS # BLD: 0.02 K/UL (ref 0–0.12)
BILIRUB SERPL-MCNC: 0.9 MG/DL (ref 0.1–1.5)
BUN SERPL-MCNC: 19 MG/DL (ref 8–22)
CALCIUM SERPL-MCNC: 8.9 MG/DL (ref 8.5–10.5)
CHLORIDE SERPL-SCNC: 106 MMOL/L (ref 96–112)
CHOLEST SERPL-MCNC: 156 MG/DL (ref 100–199)
CO2 SERPL-SCNC: 24 MMOL/L (ref 20–33)
CREAT SERPL-MCNC: 0.79 MG/DL (ref 0.5–1.4)
EOSINOPHIL # BLD AUTO: 0.12 K/UL (ref 0–0.51)
EOSINOPHIL NFR BLD: 1.4 % (ref 0–6.9)
ERYTHROCYTE [DISTWIDTH] IN BLOOD BY AUTOMATED COUNT: 46.3 FL (ref 35.9–50)
EST. AVERAGE GLUCOSE BLD GHB EST-MCNC: 108 MG/DL
FASTING STATUS PATIENT QL REPORTED: NORMAL
GFR SERPLBLD CREATININE-BSD FMLA CKD-EPI: 98 ML/MIN/1.73 M 2
GLOBULIN SER CALC-MCNC: 2.6 G/DL (ref 1.9–3.5)
GLUCOSE SERPL-MCNC: 109 MG/DL (ref 65–99)
HBA1C MFR BLD: 5.4 % (ref 4–5.6)
HCT VFR BLD AUTO: 42.8 % (ref 42–52)
HDLC SERPL-MCNC: 40 MG/DL
HGB BLD-MCNC: 14 G/DL (ref 14–18)
IMM GRANULOCYTES # BLD AUTO: 0.03 K/UL (ref 0–0.11)
IMM GRANULOCYTES NFR BLD AUTO: 0.4 % (ref 0–0.9)
LDLC SERPL CALC-MCNC: 85 MG/DL
LYMPHOCYTES # BLD AUTO: 1.14 K/UL (ref 1–4.8)
LYMPHOCYTES NFR BLD: 13.6 % (ref 22–41)
MCH RBC QN AUTO: 29.5 PG (ref 27–33)
MCHC RBC AUTO-ENTMCNC: 32.7 G/DL (ref 33.7–35.3)
MCV RBC AUTO: 90.3 FL (ref 81.4–97.8)
MONOCYTES # BLD AUTO: 0.64 K/UL (ref 0–0.85)
MONOCYTES NFR BLD AUTO: 7.6 % (ref 0–13.4)
NEUTROPHILS # BLD AUTO: 6.42 K/UL (ref 1.82–7.42)
NEUTROPHILS NFR BLD: 76.8 % (ref 44–72)
NRBC # BLD AUTO: 0 K/UL
NRBC BLD-RTO: 0 /100 WBC
PLATELET # BLD AUTO: 229 K/UL (ref 164–446)
PMV BLD AUTO: 11 FL (ref 9–12.9)
POTASSIUM SERPL-SCNC: 4.7 MMOL/L (ref 3.6–5.5)
PROT SERPL-MCNC: 7.1 G/DL (ref 6–8.2)
PSA SERPL-MCNC: 0.95 NG/ML (ref 0–4)
RBC # BLD AUTO: 4.74 M/UL (ref 4.7–6.1)
SODIUM SERPL-SCNC: 139 MMOL/L (ref 135–145)
TRIGL SERPL-MCNC: 154 MG/DL (ref 0–149)
WBC # BLD AUTO: 8.4 K/UL (ref 4.8–10.8)

## 2022-08-11 PROCEDURE — 36415 COLL VENOUS BLD VENIPUNCTURE: CPT | Mod: GA

## 2022-08-11 PROCEDURE — 85025 COMPLETE CBC W/AUTO DIFF WBC: CPT

## 2022-08-11 PROCEDURE — 80053 COMPREHEN METABOLIC PANEL: CPT

## 2022-08-11 PROCEDURE — 80061 LIPID PANEL: CPT

## 2022-08-11 PROCEDURE — 83036 HEMOGLOBIN GLYCOSYLATED A1C: CPT | Mod: GA

## 2022-08-11 PROCEDURE — 84153 ASSAY OF PSA TOTAL: CPT | Mod: GA

## 2022-08-11 PROCEDURE — 82306 VITAMIN D 25 HYDROXY: CPT

## 2022-11-09 DIAGNOSIS — G47.33 OSA ON CPAP: ICD-10-CM

## 2022-11-21 ENCOUNTER — HOSPITAL ENCOUNTER (OUTPATIENT)
Dept: LAB | Facility: MEDICAL CENTER | Age: 67
End: 2022-11-21
Attending: FAMILY MEDICINE
Payer: MEDICARE

## 2022-11-21 DIAGNOSIS — E78.5 DYSLIPIDEMIA: ICD-10-CM

## 2022-11-21 LAB
ALBUMIN SERPL BCP-MCNC: 4.6 G/DL (ref 3.2–4.9)
ALBUMIN/GLOB SERPL: 1.8 G/DL
ALP SERPL-CCNC: 60 U/L (ref 30–99)
ALT SERPL-CCNC: 25 U/L (ref 2–50)
ANION GAP SERPL CALC-SCNC: 10 MMOL/L (ref 7–16)
AST SERPL-CCNC: 21 U/L (ref 12–45)
BILIRUB SERPL-MCNC: 0.9 MG/DL (ref 0.1–1.5)
BUN SERPL-MCNC: 22 MG/DL (ref 8–22)
CALCIUM SERPL-MCNC: 9.1 MG/DL (ref 8.5–10.5)
CHLORIDE SERPL-SCNC: 101 MMOL/L (ref 96–112)
CHOLEST SERPL-MCNC: 280 MG/DL (ref 100–199)
CO2 SERPL-SCNC: 26 MMOL/L (ref 20–33)
CREAT SERPL-MCNC: 0.75 MG/DL (ref 0.5–1.4)
FASTING STATUS PATIENT QL REPORTED: NORMAL
GFR SERPLBLD CREATININE-BSD FMLA CKD-EPI: 99 ML/MIN/1.73 M 2
GLOBULIN SER CALC-MCNC: 2.5 G/DL (ref 1.9–3.5)
GLUCOSE SERPL-MCNC: 105 MG/DL (ref 65–99)
HDLC SERPL-MCNC: 51 MG/DL
LDLC SERPL CALC-MCNC: 181 MG/DL
POTASSIUM SERPL-SCNC: 4.2 MMOL/L (ref 3.6–5.5)
PROT SERPL-MCNC: 7.1 G/DL (ref 6–8.2)
SODIUM SERPL-SCNC: 137 MMOL/L (ref 135–145)
TRIGL SERPL-MCNC: 240 MG/DL (ref 0–149)

## 2022-11-21 PROCEDURE — 80053 COMPREHEN METABOLIC PANEL: CPT

## 2022-11-21 PROCEDURE — 80061 LIPID PANEL: CPT

## 2022-11-21 PROCEDURE — 36415 COLL VENOUS BLD VENIPUNCTURE: CPT

## 2023-03-24 ENCOUNTER — OFFICE VISIT (OUTPATIENT)
Dept: SLEEP MEDICINE | Facility: MEDICAL CENTER | Age: 68
End: 2023-03-24
Attending: STUDENT IN AN ORGANIZED HEALTH CARE EDUCATION/TRAINING PROGRAM
Payer: MEDICARE

## 2023-03-24 VITALS
RESPIRATION RATE: 16 BRPM | BODY MASS INDEX: 29.01 KG/M2 | OXYGEN SATURATION: 96 % | HEART RATE: 80 BPM | SYSTOLIC BLOOD PRESSURE: 128 MMHG | HEIGHT: 66 IN | DIASTOLIC BLOOD PRESSURE: 84 MMHG | WEIGHT: 180.5 LBS

## 2023-03-24 DIAGNOSIS — G47.33 OSA ON CPAP: Primary | ICD-10-CM

## 2023-03-24 PROCEDURE — 99213 OFFICE O/P EST LOW 20 MIN: CPT | Performed by: STUDENT IN AN ORGANIZED HEALTH CARE EDUCATION/TRAINING PROGRAM

## 2023-03-24 ASSESSMENT — FIBROSIS 4 INDEX: FIB4 SCORE: 1.21

## 2023-03-24 NOTE — PROGRESS NOTES
[facility] Follow-up Visit    CC: Yearly follow-up for management of obstructive sleep apnea    HPI:  Amadeo Randall is a 67 y.o.male  with hyperlipidemia, BPH, and obstructive sleep apnea on CPAP.  Presents today to follow-up regarding management of obstructive sleep apnea.    He continues to use his CPAP machine nightly.  He finds with using his CPAP he feels more rested and gets a better night sleep.  States has been on CPAP for many years and continues to find it beneficial.  Currently has no complaints.  Overall finds the pressure and mask comfortable.  States he does like his mask which is a P30 I mask.  He is receiving supplies regularly.    DME provider: Preferred   Device: Airsense 10  Mask: P30i   Aerophagia: No   Snoring: No   Dry mouth: No   Leak: No   Skin irritation: No   Chin strap: No     Sleep History  Patient completed a PSG study, and a PSG titration study at [facility] in [place], [place] and the results indicated on PSG study from 11/27/13 that the patient has CAROLIN with an AHI of 21.2.  The study showed no prolonged hypoxia, but there was transient desaturations with the lowest oxygen saturation of 81%.  The patient spent 2.2% of sleep time with oxygen saturation less than 89% as compared to a normal of 90% or above.    The PSG titration study from 12/13/13 indicated improvement of the respiratory events and hypoxia with positive airway pressure and the best improvement has been noticed at a pressure of 7 cm of water which decreased respiratory events to 0.9/h.  The lowest oxygen saturation during the study well and 7 cm of water pressure was 95%.    Patient Active Problem List    Diagnosis Date Noted    Multiple osteochondroma 12/02/2020    Bone cyst of right humerus 10/20/2020    Skin lesion 06/11/2018    Dyslipidemia 02/27/2018    Benign prostatic hyperplasia without lower urinary tract symptoms 02/27/2018    S/P cervical spinal fusion 02/27/2018    History of total replacement  of both hip joints 02/27/2018    CAROLIN on CPAP 02/27/2018    Benign colonic polyp 05/06/2014    Actinic keratosis 06/20/2011       Past Medical History:   Diagnosis Date    Back pain     Chickenpox     Hyperlipidemia     Kidney stone     Mumps     Painful joint     Ringing in ears     Sleep apnea     Tonsillitis     Wears glasses         Past Surgical History:   Procedure Laterality Date    HIP REPLACEMENT, TOTAL         Family History   Problem Relation Age of Onset    Alzheimer's Disease Mother         Passed 2012    Cancer Mother     Breast Cancer Mother     Sleep Apnea Brother     Stroke Father        Social History     Socioeconomic History    Marital status:      Spouse name: Not on file    Number of children: Not on file    Years of education: Not on file    Highest education level: Some college, no degree   Occupational History    Not on file   Tobacco Use    Smoking status: Never    Smokeless tobacco: Never   Vaping Use    Vaping Use: Never used   Substance and Sexual Activity    Alcohol use: Yes     Comment: occ    Drug use: No    Sexual activity: Not on file   Other Topics Concern    Not on file   Social History Narrative    Not on file     Social Determinants of Health     Financial Resource Strain: Low Risk     Difficulty of Paying Living Expenses: Not very hard   Food Insecurity: No Food Insecurity    Worried About Running Out of Food in the Last Year: Never true    Ran Out of Food in the Last Year: Never true   Transportation Needs: No Transportation Needs    Lack of Transportation (Medical): No    Lack of Transportation (Non-Medical): No   Physical Activity: Insufficiently Active    Days of Exercise per Week: 3 days    Minutes of Exercise per Session: 20 min   Stress: No Stress Concern Present    Feeling of Stress : Not at all   Social Connections: Moderately Integrated    Frequency of Communication with Friends and Family: More than three times a week    Frequency of Social Gatherings with  Friends and Family: More than three times a week    Attends [employer] Services: Never    Active Member of Clubs or Organizations: No    Attends Club or Organization Meetings: 1 to 4 times per year    Marital Status:    Intimate Partner Violence: Not on file   Housing Stability: Low Risk     Unable to Pay for Housing in the Last Year: No    Number of Places Lived in the Last Year: 1    Unstable Housing in the Last Year: No       Current Outpatient Medications   Medication Sig Dispense Refill    MAGNESIUM PO Take  by mouth.      Ascorbic Acid (VITAMIN C) 1000 MG Tab       ezetimibe (ZETIA) 10 MG Tab Take 1 Tablet by mouth every day. 90 Tablet 3    atorvastatin (LIPITOR) 20 MG Tab Take 1 Tablet by mouth every day. 90 Tablet 3    tamsulosin (FLOMAX) 0.4 MG capsule Take 1 Capsule by mouth 1/2 hour after breakfast. 90 Capsule 3    Turmeric 500 MG Cap Take 1,500 mg by mouth.      Saw Palmetto, Serenoa repens, (SAW PALMETTO PO) Take 1,000 mg by mouth. Takes 2 times daily      vitamin D (CHOLECALCIFEROL) 1000 Unit (25 mcg) Tab Take 5,000 Units by mouth every day. Takes 2 times daily      Multiple Vitamins-Minerals (ZINC PO) Take  by mouth.      valacyclovir (VALTREX) 1 GM Tab Take 1 Tab by mouth 2 times a day. TAKE 2 TABLETS BY MOUTH TWICE DAILY FOR 2 DAYS ,  USE  AT  THE  FIRST  SIGN  OF  SYMPTOMS 20 Tab 5    Coenzyme Q10 (CO Q-10) 300 MG Cap Take  by mouth.      aspirin (ASA) 81 MG Chew Tab chewable tablet Take 81 mg by mouth every day.      glucosamine Sulfate 500 MG Cap Take 500 mg by mouth every day.      Omega-3 Fatty Acids (FISH OIL) 1000 MG Cap capsule Take 1,000 mg by mouth 3 times a day, with meals.      diazePAM (VALIUM) 2 MG Tab diazepam 2 mg tablet   TAKE 1 TABLET BY MOUTH EVERY 6 HOURS AS NEEDED FOR ANXIETY FOR UP TO 90 DAYS       No current facility-administered medications for this visit.        ALLERGIES: Pcn [penicillins] and Skin adhesives  [mecrylate]    ROS  Constitutional: Denies fevers, Denies  "weight changes  Ears/Nose/Throat/Mouth: Denies nasal congestion or sore throat   Cardiovascular: Denies chest pain  Respiratory: Denies shortness of breath, Denies cough  Gastrointestinal/Hepatic: Denies nausea, vomiting  Sleep: see HPI      PHYSICAL EXAM  /84 (BP Location: Left arm, Patient Position: Sitting, BP Cuff Size: Adult)   Pulse 80   Resp 16   Ht 1.676 m (5' 6\")   Wt 81.9 kg (180 lb 8 oz)   SpO2 96%   BMI 29.13 kg/m²   Appearance: Well-nourished, well-developed, no acute distress  Eyes:  No scleral icterus , EOMI  ENMT: masked  Musculoskeletal:  Grossly normal; gait and station normal; digits and nails normal  Skin:  No rashes, petechiae, cyanosis  Neurologic: without focal signs; oriented to person, time, place, and purpose; judgement intact      Medical Decision Making   Assessment and Plan  Amadeo Randall is a 67 y.o.male  with hyperlipidemia, BPH, and obstructive sleep apnea on CPAP.  Presents today to follow-up regarding management of obstructive sleep apnea.    The medical record was reviewed.    Obstructive sleep apnea  Compliance data reviewed showing 100% usage > 4hours in last 30  days. Average AHI 4.7 events/hour. 90-95% pressure 9.8 CWP. Pt continues to use and benefit from machine.   Current settings auto CPAP 8-10 cm       PLAN:   -Order placed for mask and supplies   -Advised to reach out via MyChart with questions     Has been advised to continue the current CPAP, clean equipment frequently, and get new mask and supplies as allowed by insurance and DME. Recommend an earlier appointment, if significant treatment barriers develop.    Patients with CAROLIN are at increased risk of cardiovascular disease including coronary artery disease, systemic arterial hypertension, pulmonary arterial hypertension, cardiac arrythmias, and stroke. The patient was advised to avoid driving a motor vehicle when drowsy.    Positive airway pressure will favorably impact many of the adverse conditions and " effects provoked by CAROLIN.    Have advised the patient to follow up with the appropriate healthcare practitioners for all other medical problems and issues.    Return in about 1 year (around 3/24/2024) for CAROLIN.      Please note portions of this record was created using voice recognition software. I have made every reasonable attempt to correct obvious errors, but I expect that there are errors of grammar and possibly content I did not discover before finalizing the note.

## 2023-06-15 ENCOUNTER — HOSPITAL ENCOUNTER (OUTPATIENT)
Dept: RADIOLOGY | Facility: MEDICAL CENTER | Age: 68
End: 2023-06-15
Payer: MEDICARE

## 2023-06-15 ENCOUNTER — OFFICE VISIT (OUTPATIENT)
Dept: URGENT CARE | Facility: PHYSICIAN GROUP | Age: 68
End: 2023-06-15
Payer: MEDICARE

## 2023-06-15 VITALS
OXYGEN SATURATION: 97 % | WEIGHT: 184 LBS | SYSTOLIC BLOOD PRESSURE: 124 MMHG | HEIGHT: 66 IN | DIASTOLIC BLOOD PRESSURE: 76 MMHG | BODY MASS INDEX: 29.57 KG/M2 | TEMPERATURE: 97.3 F | RESPIRATION RATE: 14 BRPM | HEART RATE: 91 BPM

## 2023-06-15 DIAGNOSIS — N20.0 RIGHT KIDNEY STONE: ICD-10-CM

## 2023-06-15 DIAGNOSIS — N20.0 KIDNEY STONE ON RIGHT SIDE: ICD-10-CM

## 2023-06-15 DIAGNOSIS — R10.9 RIGHT FLANK PAIN: ICD-10-CM

## 2023-06-15 DIAGNOSIS — R31.9 HEMATURIA, UNSPECIFIED TYPE: ICD-10-CM

## 2023-06-15 DIAGNOSIS — N40.0 ENLARGED PROSTATE: ICD-10-CM

## 2023-06-15 LAB
APPEARANCE UR: CLEAR
BILIRUB UR STRIP-MCNC: NEGATIVE MG/DL
COLOR UR AUTO: NORMAL
GLUCOSE UR STRIP.AUTO-MCNC: NEGATIVE MG/DL
KETONES UR STRIP.AUTO-MCNC: NEGATIVE MG/DL
LEUKOCYTE ESTERASE UR QL STRIP.AUTO: NEGATIVE
NITRITE UR QL STRIP.AUTO: NEGATIVE
PH UR STRIP.AUTO: 5.5 [PH] (ref 5–8)
PROT UR QL STRIP: 30 MG/DL
RBC UR QL AUTO: NORMAL
SP GR UR STRIP.AUTO: 1.03
UROBILINOGEN UR STRIP-MCNC: NEGATIVE MG/DL

## 2023-06-15 PROCEDURE — 3078F DIAST BP <80 MM HG: CPT

## 2023-06-15 PROCEDURE — 74176 CT ABD & PELVIS W/O CONTRAST: CPT

## 2023-06-15 PROCEDURE — 81002 URINALYSIS NONAUTO W/O SCOPE: CPT

## 2023-06-15 PROCEDURE — 3074F SYST BP LT 130 MM HG: CPT

## 2023-06-15 PROCEDURE — 99213 OFFICE O/P EST LOW 20 MIN: CPT

## 2023-06-15 RX ORDER — CHLORAL HYDRATE 500 MG
1000 CAPSULE ORAL
COMMUNITY

## 2023-06-15 RX ORDER — TAMSULOSIN HYDROCHLORIDE 0.4 MG/1
0.4 CAPSULE ORAL
Qty: 90 CAPSULE | Refills: 3 | Status: CANCELLED | OUTPATIENT
Start: 2023-06-15

## 2023-06-15 RX ORDER — COVID-19 MOLECULAR TEST ASSAY
KIT MISCELLANEOUS
COMMUNITY
Start: 2023-03-27 | End: 2023-06-15

## 2023-06-15 ASSESSMENT — ENCOUNTER SYMPTOMS
VOMITING: 0
FLANK PAIN: 1
CHILLS: 0
NAUSEA: 0
SHORTNESS OF BREATH: 0
MYALGIAS: 0
FEVER: 0

## 2023-06-15 ASSESSMENT — FIBROSIS 4 INDEX: FIB4 SCORE: 1.21

## 2023-06-15 NOTE — TELEPHONE ENCOUNTER
Received request via: Patient    Was the patient seen in the last year in this department? Yes    Does the patient have an active prescription (recently filled or refills available) for medication(s) requested? NO    Does the patient have half-way Plus and need 100 day supply (blood pressure, diabetes and cholesterol meds only)? Patient does not have SCP

## 2023-06-15 NOTE — TELEPHONE ENCOUNTER
6/15/23 at 1655: Patient called to discuss results of CT showing 1mm kidney stone in R ureter as well as fatty liver. Patient will perform symptomatic management with Ibuprofen and hydration with straining urine. Stone does not require Flomax at this time due to small size (UpToDate recommendations). All questions answered. Patient will return to the clinic if symptoms do not resolve or worsen in any way.

## 2023-06-15 NOTE — PROGRESS NOTES
Subjective:     CHIEF COMPLAINT  Chief Complaint   Patient presents with    Back Pain           Blood in Urine       HPI  Amadeo Randall is a very pleasant 67 y.o. male who presents with right-sided back pain and blood in his urine that started yesterday.  He reports that his urine has a pink tinge to it but does not have bright red blood.  He has a history of a kidney stone in the past, although he said that his symptoms are not as dramatic as they were back then.  He has not had any burning with urination, but has had slightly increased urgency to urinate.  He reports that he used to take Flomax, but is no longer taking it.  He has tried taking 3 Advil with moderate relief of symptoms.  He has not had any fevers, body aches, or chills.  He is tolerating food and liquids normally.      REVIEW OF SYSTEMS  Review of Systems   Constitutional:  Negative for chills, fever and malaise/fatigue.   Respiratory:  Negative for shortness of breath.    Cardiovascular:  Negative for chest pain.   Gastrointestinal:  Negative for nausea and vomiting.   Genitourinary:  Positive for flank pain (R sided), hematuria and urgency. Negative for dysuria and frequency.   Musculoskeletal:  Negative for myalgias.       PAST MEDICAL HISTORY  Patient Active Problem List    Diagnosis Date Noted    Multiple osteochondroma 12/02/2020    Bone cyst of right humerus 10/20/2020    Skin lesion 06/11/2018    Dyslipidemia 02/27/2018    Benign prostatic hyperplasia without lower urinary tract symptoms 02/27/2018    S/P cervical spinal fusion 02/27/2018    History of total replacement of both hip joints 02/27/2018    CAROLIN on CPAP 02/27/2018    Benign colonic polyp 05/06/2014    Actinic keratosis 06/20/2011       SURGICAL HISTORY   has a past surgical history that includes hip replacement, total.    ALLERGIES  Allergies   Allergen Reactions    Pcn [Penicillins] Rash     Rash    Tape Unspecified    Skin Adhesives  [Mecrylate]        CURRENT MEDICATIONS  Home  "Medications       Reviewed by Mandy Mendez P.A.-C. (Physician Assistant) on 06/15/23 at 1305  Med List Status: <None>     Medication Last Dose Status   Ascorbic Acid (VITAMIN C) 1000 MG Tab Taking Active   aspirin (ASA) 81 MG Chew Tab chewable tablet Taking Active   atorvastatin (LIPITOR) 20 MG Tab Taking Active   Coenzyme Q10 (CO Q-10) 300 MG Cap Taking Active   diazePAM (VALIUM) 2 MG Tab Taking Active   ezetimibe (ZETIA) 10 MG Tab Taking Active   glucosamine Sulfate 500 MG Cap Taking Active   MAGNESIUM PO Taking Active   Multiple Vitamins-Minerals (ZINC PO) Taking Active   Omega-3 Fatty Acids (FISH OIL) 1000 MG Cap capsule Taking Active   Saw Palmetto, Serenoa repens, (SAW PALMETTO PO) Taking Active   tamsulosin (FLOMAX) 0.4 MG capsule Taking Active   Turmeric 500 MG Cap Taking Active   valacyclovir (VALTREX) 1 GM Tab Taking Active   vitamin D (CHOLECALCIFEROL) 1000 Unit (25 mcg) Tab Taking Active                    SOCIAL HISTORY  Social History     Tobacco Use    Smoking status: Never    Smokeless tobacco: Never   Vaping Use    Vaping Use: Never used   Substance and Sexual Activity    Alcohol use: Yes     Comment: occ    Drug use: No    Sexual activity: Not on file       FAMILY HISTORY  Family History   Problem Relation Age of Onset    Alzheimer's Disease Mother         Passed 2012    Cancer Mother     Breast Cancer Mother     Sleep Apnea Brother     Stroke Father           Objective:     VITAL SIGNS: /76   Pulse 91   Temp 36.3 °C (97.3 °F) (Temporal)   Resp 14   Ht 1.676 m (5' 6\")   Wt 83.5 kg (184 lb)   SpO2 97%   BMI 29.70 kg/m²     PHYSICAL EXAM  Physical Exam  Vitals reviewed.   Constitutional:       General: He is not in acute distress.     Appearance: Normal appearance. He is not ill-appearing or toxic-appearing.   HENT:      Head: Normocephalic and atraumatic.      Nose: Nose normal.      Mouth/Throat:      Mouth: Mucous membranes are moist.   Eyes:      Conjunctiva/sclera: " Conjunctivae normal.      Pupils: Pupils are equal, round, and reactive to light.   Cardiovascular:      Rate and Rhythm: Normal rate and regular rhythm.      Heart sounds: Normal heart sounds.   Pulmonary:      Effort: Pulmonary effort is normal. No respiratory distress.      Breath sounds: Normal breath sounds. No wheezing, rhonchi or rales.   Abdominal:      General: Abdomen is flat. There is no distension.      Palpations: Abdomen is soft. There is no mass.      Tenderness: There is no abdominal tenderness. There is no right CVA tenderness, left CVA tenderness, guarding or rebound.   Musculoskeletal:      Comments: No tenderness to palpation along R flank. No bruising or skin changes.    Skin:     General: Skin is warm and dry.   Neurological:      General: No focal deficit present.      Mental Status: He is alert and oriented to person, place, and time.   Psychiatric:         Mood and Affect: Mood normal.         Assessment/Plan:     1. Hematuria, unspecified type  - POCT Urinalysis  - CT-RENAL COLIC EVALUATION(A/P W/O); Future    2. Right flank pain  - CT-RENAL COLIC EVALUATION(A/P W/O); Future    3. Right kidney stone    Other orders  - Omega-3 Fatty Acids (FISH OIL) 1000 MG Cap capsule; 1,000 mg.  -If symptoms worsen be seen at ER   -Increase hydration  -Tylenol/ibuprofen OTC as needed for pain    CT-RENAL COLIC EVALUATION(A/P W/O)  Narrative: 6/15/2023 3:58 PM    HISTORY/REASON FOR EXAM:  Right-sided flank pain. Hematuria    TECHNIQUE/EXAM DESCRIPTION AND NUMBER OF VIEWS: CT scan renal/colic without contrast.    5 mm helical images of the abdomen and pelvis were obtained from the diaphragmatic domes through the pubic symphysis.    Low dose optimization technique was utilized for this CT exam including automated exposure control and adjustment of the mA and/or kV according to patient size.    COMPARISON:    FINDINGS:    Abdomen:  There is no evidence of focal consolidation or pleural effusion within the lung  bases.  There is fatty change of the liver  There is right perinephric fat stranding at right renal enlargement. There is mild right-sided hydronephrosis. There is likely a tiny 1 mm stone located at the right ureterovesical junction which is seen on the coronal reconstructed images only. The   pelvis however is obscured by extensive streak artifact related to presence of bilateral hip arthroplasties. There are no stones seen within the urinary bladder. There is a tiny punctate right lower pole renal calyceal stone.    Pelvis:  The appendix is visualized and appears normal.  There is no evidence of inflammatory change seen in the region of the bowel.  There is no evidence of free fluid. There is calcification of the prostate.  There are bilateral hip arthroplasties. There is a osteochondroma emanating posteriorly from the right iliac crest which measures 4 x 2.5 cm in size. There is degenerative change of the lumbar spine.  Impression: 1.  Mild left-sided hydronephrosis with asymmetric left renal enlargement and left perinephric fat stranding. The pelvis is obscured by streak artifact related to bilateral hip arthroplasties however there is likely a tiny 1 mm stone located within the   left lower ureter at the ureterovesical junction seen only on the coronal reconstructed images.    2.  Tiny 1 mm right lower pole renal calyceal stone.    3.  Fatty liver.    4.  Osteochondroma emanating posteriorly from the right iliac crest.     MDM/Comments:  I have prepared for this visit by personally reviewing the patient's prevous medical records and labs including: most recent CBC, CMP, and GFR. Patient has a history of kidney stones.  Patient has been ordered an outpatient CT scan.  Shared decision making used and patient will be seen at the ER if his symptoms worsen between now and the time of his CT.  Patient demonstrated understanding of treatment plan and appears stable and overall well.     Outpatient CT-renal without  contrast ordered for 4PM today at [facility]. Results showing 1mm kidney stone in R kidney. Patient called and informed of results. He will continue taking Advil as needed and will monitor for passage of stone.     Differential diagnosis, natural history, supportive care, and indications for immediate follow-up discussed. All questions answered. Patient agrees with the plan of care.    Follow-up as needed if symptoms worsen or fail to improve to PCP, Urgent care or Emergency Room.    I have personally reviewed prior external notes and test results pertinent to today's visit.  I have independently reviewed and interpreted all diagnostics ordered during this urgent care acute visit.   Discussed management options (risks,benefits, and alternatives to treatment). Pt expresses understanding and the treatment plan was agreed upon. Questions were encouraged and answered to pt's satisfaction.    Please note that this dictation was created using voice recognition software. I have made a reasonable attempt to correct obvious errors, but I expect that there are errors of grammar and possibly content that I did not discover before finalizing the note.

## 2023-06-26 DIAGNOSIS — R73.01 ELEVATED FASTING GLUCOSE: ICD-10-CM

## 2023-06-26 DIAGNOSIS — Z12.5 SCREENING FOR MALIGNANT NEOPLASM OF PROSTATE: ICD-10-CM

## 2023-06-26 DIAGNOSIS — R79.89 ABNORMAL CBC: ICD-10-CM

## 2023-06-26 DIAGNOSIS — E78.5 DYSLIPIDEMIA: ICD-10-CM

## 2023-06-26 DIAGNOSIS — N40.0 ENLARGED PROSTATE: ICD-10-CM

## 2023-06-26 RX ORDER — TAMSULOSIN HYDROCHLORIDE 0.4 MG/1
0.4 CAPSULE ORAL
Qty: 90 CAPSULE | Refills: 3 | Status: SHIPPED | OUTPATIENT
Start: 2023-06-26

## 2023-10-17 ENCOUNTER — HOSPITAL ENCOUNTER (OUTPATIENT)
Dept: LAB | Facility: MEDICAL CENTER | Age: 68
End: 2023-10-17
Attending: FAMILY MEDICINE
Payer: MEDICARE

## 2023-10-17 DIAGNOSIS — Z12.5 SCREENING FOR MALIGNANT NEOPLASM OF PROSTATE: ICD-10-CM

## 2023-10-17 DIAGNOSIS — E78.5 DYSLIPIDEMIA: ICD-10-CM

## 2023-10-17 DIAGNOSIS — R79.89 ABNORMAL CBC: ICD-10-CM

## 2023-10-17 DIAGNOSIS — R73.01 ELEVATED FASTING GLUCOSE: ICD-10-CM

## 2023-10-17 LAB
ALBUMIN SERPL BCP-MCNC: 4.5 G/DL (ref 3.2–4.9)
ALBUMIN/GLOB SERPL: 1.7 G/DL
ALP SERPL-CCNC: 59 U/L (ref 30–99)
ALT SERPL-CCNC: 23 U/L (ref 2–50)
ANION GAP SERPL CALC-SCNC: 11 MMOL/L (ref 7–16)
AST SERPL-CCNC: 21 U/L (ref 12–45)
BASOPHILS # BLD AUTO: 0.2 % (ref 0–1.8)
BASOPHILS # BLD: 0.01 K/UL (ref 0–0.12)
BILIRUB SERPL-MCNC: 0.7 MG/DL (ref 0.1–1.5)
BUN SERPL-MCNC: 21 MG/DL (ref 8–22)
CALCIUM ALBUM COR SERPL-MCNC: 8.6 MG/DL (ref 8.5–10.5)
CALCIUM SERPL-MCNC: 9 MG/DL (ref 8.5–10.5)
CHLORIDE SERPL-SCNC: 105 MMOL/L (ref 96–112)
CHOLEST SERPL-MCNC: 161 MG/DL (ref 100–199)
CO2 SERPL-SCNC: 25 MMOL/L (ref 20–33)
CREAT SERPL-MCNC: 0.76 MG/DL (ref 0.5–1.4)
EOSINOPHIL # BLD AUTO: 0.07 K/UL (ref 0–0.51)
EOSINOPHIL NFR BLD: 1.4 % (ref 0–6.9)
ERYTHROCYTE [DISTWIDTH] IN BLOOD BY AUTOMATED COUNT: 48.4 FL (ref 35.9–50)
EST. AVERAGE GLUCOSE BLD GHB EST-MCNC: 117 MG/DL
FASTING STATUS PATIENT QL REPORTED: NORMAL
GFR SERPLBLD CREATININE-BSD FMLA CKD-EPI: 99 ML/MIN/1.73 M 2
GLOBULIN SER CALC-MCNC: 2.6 G/DL (ref 1.9–3.5)
GLUCOSE SERPL-MCNC: 112 MG/DL (ref 65–99)
HBA1C MFR BLD: 5.7 % (ref 4–5.6)
HCT VFR BLD AUTO: 46.8 % (ref 42–52)
HDLC SERPL-MCNC: 45 MG/DL
HGB BLD-MCNC: 15.1 G/DL (ref 14–18)
IMM GRANULOCYTES # BLD AUTO: 0.01 K/UL (ref 0–0.11)
IMM GRANULOCYTES NFR BLD AUTO: 0.2 % (ref 0–0.9)
LDLC SERPL CALC-MCNC: 93 MG/DL
LYMPHOCYTES # BLD AUTO: 1.08 K/UL (ref 1–4.8)
LYMPHOCYTES NFR BLD: 21.8 % (ref 22–41)
MCH RBC QN AUTO: 29 PG (ref 27–33)
MCHC RBC AUTO-ENTMCNC: 32.3 G/DL (ref 32.3–36.5)
MCV RBC AUTO: 90 FL (ref 81.4–97.8)
MONOCYTES # BLD AUTO: 0.45 K/UL (ref 0–0.85)
MONOCYTES NFR BLD AUTO: 9.1 % (ref 0–13.4)
NEUTROPHILS # BLD AUTO: 3.33 K/UL (ref 1.82–7.42)
NEUTROPHILS NFR BLD: 67.3 % (ref 44–72)
NRBC # BLD AUTO: 0 K/UL
NRBC BLD-RTO: 0 /100 WBC (ref 0–0.2)
PLATELET # BLD AUTO: 200 K/UL (ref 164–446)
PMV BLD AUTO: 11.4 FL (ref 9–12.9)
POTASSIUM SERPL-SCNC: 4.3 MMOL/L (ref 3.6–5.5)
PROT SERPL-MCNC: 7.1 G/DL (ref 6–8.2)
PSA SERPL-MCNC: 0.69 NG/ML (ref 0–4)
RBC # BLD AUTO: 5.2 M/UL (ref 4.7–6.1)
SODIUM SERPL-SCNC: 141 MMOL/L (ref 135–145)
TRIGL SERPL-MCNC: 114 MG/DL (ref 0–149)
WBC # BLD AUTO: 5 K/UL (ref 4.8–10.8)

## 2023-10-17 PROCEDURE — 83036 HEMOGLOBIN GLYCOSYLATED A1C: CPT | Mod: GA

## 2023-10-17 PROCEDURE — 80053 COMPREHEN METABOLIC PANEL: CPT

## 2023-10-17 PROCEDURE — 80061 LIPID PANEL: CPT

## 2023-10-17 PROCEDURE — 85025 COMPLETE CBC W/AUTO DIFF WBC: CPT

## 2023-10-17 PROCEDURE — 84153 ASSAY OF PSA TOTAL: CPT | Mod: GA

## 2023-10-17 PROCEDURE — 36415 COLL VENOUS BLD VENIPUNCTURE: CPT | Mod: GA

## 2023-11-02 ENCOUNTER — OFFICE VISIT (OUTPATIENT)
Dept: MEDICAL GROUP | Facility: PHYSICIAN GROUP | Age: 68
End: 2023-11-02
Payer: MEDICARE

## 2023-11-02 VITALS
RESPIRATION RATE: 14 BRPM | OXYGEN SATURATION: 96 % | HEIGHT: 66 IN | DIASTOLIC BLOOD PRESSURE: 68 MMHG | WEIGHT: 175 LBS | HEART RATE: 99 BPM | TEMPERATURE: 97.7 F | SYSTOLIC BLOOD PRESSURE: 132 MMHG | BODY MASS INDEX: 28.12 KG/M2

## 2023-11-02 DIAGNOSIS — M25.521 BILATERAL ELBOW JOINT PAIN: ICD-10-CM

## 2023-11-02 DIAGNOSIS — Q78.6 MULTIPLE OSTEOCHONDROMA: ICD-10-CM

## 2023-11-02 DIAGNOSIS — R73.09 ELEVATED HEMOGLOBIN A1C: ICD-10-CM

## 2023-11-02 DIAGNOSIS — G47.33 OSA ON CPAP: ICD-10-CM

## 2023-11-02 DIAGNOSIS — E78.5 DYSLIPIDEMIA: ICD-10-CM

## 2023-11-02 DIAGNOSIS — M25.522 BILATERAL ELBOW JOINT PAIN: ICD-10-CM

## 2023-11-02 DIAGNOSIS — Z98.1 S/P CERVICAL SPINAL FUSION: ICD-10-CM

## 2023-11-02 DIAGNOSIS — B00.1 COLD SORE: ICD-10-CM

## 2023-11-02 DIAGNOSIS — Z96.643 HISTORY OF TOTAL REPLACEMENT OF BOTH HIP JOINTS: ICD-10-CM

## 2023-11-02 DIAGNOSIS — L57.0 ACTINIC KERATOSIS: ICD-10-CM

## 2023-11-02 DIAGNOSIS — L98.9 SKIN LESION: ICD-10-CM

## 2023-11-02 DIAGNOSIS — K63.5 BENIGN COLONIC POLYP: ICD-10-CM

## 2023-11-02 DIAGNOSIS — N40.0 BENIGN PROSTATIC HYPERPLASIA WITHOUT LOWER URINARY TRACT SYMPTOMS: ICD-10-CM

## 2023-11-02 PROCEDURE — 3075F SYST BP GE 130 - 139MM HG: CPT | Performed by: FAMILY MEDICINE

## 2023-11-02 PROCEDURE — G0439 PPPS, SUBSEQ VISIT: HCPCS | Performed by: FAMILY MEDICINE

## 2023-11-02 PROCEDURE — 3078F DIAST BP <80 MM HG: CPT | Performed by: FAMILY MEDICINE

## 2023-11-02 RX ORDER — VALACYCLOVIR HYDROCHLORIDE 1 G/1
1000 TABLET, FILM COATED ORAL 2 TIMES DAILY
Qty: 20 TABLET | Refills: 5 | Status: SHIPPED | OUTPATIENT
Start: 2023-11-02

## 2023-11-02 RX ORDER — DIAZEPAM 2 MG/1
TABLET ORAL
Qty: 30 TABLET | Refills: 0 | Status: SHIPPED | OUTPATIENT
Start: 2023-11-02 | End: 2023-12-02

## 2023-11-02 RX ORDER — EZETIMIBE 10 MG/1
1 TABLET ORAL DAILY
COMMUNITY
End: 2023-11-02

## 2023-11-02 SDOH — ECONOMIC STABILITY: FOOD INSECURITY: WITHIN THE PAST 12 MONTHS, YOU WORRIED THAT YOUR FOOD WOULD RUN OUT BEFORE YOU GOT MONEY TO BUY MORE.: NEVER TRUE

## 2023-11-02 SDOH — HEALTH STABILITY: PHYSICAL HEALTH: ON AVERAGE, HOW MANY MINUTES DO YOU ENGAGE IN EXERCISE AT THIS LEVEL?: PATIENT DECLINED

## 2023-11-02 SDOH — ECONOMIC STABILITY: FOOD INSECURITY: WITHIN THE PAST 12 MONTHS, THE FOOD YOU BOUGHT JUST DIDN'T LAST AND YOU DIDN'T HAVE MONEY TO GET MORE.: NEVER TRUE

## 2023-11-02 SDOH — HEALTH STABILITY: PHYSICAL HEALTH

## 2023-11-02 SDOH — ECONOMIC STABILITY: HOUSING INSECURITY: IN THE LAST 12 MONTHS, HOW MANY PLACES HAVE YOU LIVED?: 1

## 2023-11-02 SDOH — ECONOMIC STABILITY: INCOME INSECURITY: IN THE LAST 12 MONTHS, WAS THERE A TIME WHEN YOU WERE NOT ABLE TO PAY THE MORTGAGE OR RENT ON TIME?: NO

## 2023-11-02 SDOH — ECONOMIC STABILITY: INCOME INSECURITY: HOW HARD IS IT FOR YOU TO PAY FOR THE VERY BASICS LIKE FOOD, HOUSING, MEDICAL CARE, AND HEATING?: NOT VERY HARD

## 2023-11-02 ASSESSMENT — LIFESTYLE VARIABLES
HOW OFTEN DO YOU HAVE A DRINK CONTAINING ALCOHOL: 4 OR MORE TIMES A WEEK
SKIP TO QUESTIONS 9-10: 1
HOW MANY STANDARD DRINKS CONTAINING ALCOHOL DO YOU HAVE ON A TYPICAL DAY: 1 OR 2
AUDIT-C TOTAL SCORE: 4
HOW OFTEN DO YOU HAVE SIX OR MORE DRINKS ON ONE OCCASION: NEVER

## 2023-11-02 ASSESSMENT — SOCIAL DETERMINANTS OF HEALTH (SDOH)
IN A TYPICAL WEEK, HOW MANY TIMES DO YOU TALK ON THE PHONE WITH FAMILY, FRIENDS, OR NEIGHBORS?: MORE THAN THREE TIMES A WEEK
WITHIN THE PAST 12 MONTHS, YOU WORRIED THAT YOUR FOOD WOULD RUN OUT BEFORE YOU GOT THE MONEY TO BUY MORE: NEVER TRUE
IN A TYPICAL WEEK, HOW MANY TIMES DO YOU TALK ON THE PHONE WITH FAMILY, FRIENDS, OR NEIGHBORS?: MORE THAN THREE TIMES A WEEK
HOW OFTEN DO YOU ATTEND CHURCH OR RELIGIOUS SERVICES?: NEVER
DO YOU BELONG TO ANY CLUBS OR ORGANIZATIONS SUCH AS CHURCH GROUPS UNIONS, FRATERNAL OR ATHLETIC GROUPS, OR SCHOOL GROUPS?: YES
HOW OFTEN DO YOU GET TOGETHER WITH FRIENDS OR RELATIVES?: MORE THAN THREE TIMES A WEEK
HOW OFTEN DO YOU ATTEND CHURCH OR RELIGIOUS SERVICES?: NEVER
HOW OFTEN DO YOU ATTENT MEETINGS OF THE CLUB OR ORGANIZATION YOU BELONG TO?: MORE THAN 4 TIMES PER YEAR
HOW HARD IS IT FOR YOU TO PAY FOR THE VERY BASICS LIKE FOOD, HOUSING, MEDICAL CARE, AND HEATING?: NOT VERY HARD
HOW MANY DRINKS CONTAINING ALCOHOL DO YOU HAVE ON A TYPICAL DAY WHEN YOU ARE DRINKING: 1 OR 2
HOW OFTEN DO YOU HAVE SIX OR MORE DRINKS ON ONE OCCASION: NEVER
DO YOU BELONG TO ANY CLUBS OR ORGANIZATIONS SUCH AS CHURCH GROUPS UNIONS, FRATERNAL OR ATHLETIC GROUPS, OR SCHOOL GROUPS?: YES
HOW OFTEN DO YOU GET TOGETHER WITH FRIENDS OR RELATIVES?: MORE THAN THREE TIMES A WEEK
HOW OFTEN DO YOU HAVE A DRINK CONTAINING ALCOHOL: 4 OR MORE TIMES A WEEK
HOW OFTEN DO YOU ATTENT MEETINGS OF THE CLUB OR ORGANIZATION YOU BELONG TO?: MORE THAN 4 TIMES PER YEAR

## 2023-11-02 ASSESSMENT — FIBROSIS 4 INDEX: FIB4 SCORE: 1.47

## 2023-11-02 ASSESSMENT — ACTIVITIES OF DAILY LIVING (ADL): BATHING_REQUIRES_ASSISTANCE: 0

## 2023-11-02 ASSESSMENT — ENCOUNTER SYMPTOMS: GENERAL WELL-BEING: GOOD

## 2023-11-02 ASSESSMENT — PATIENT HEALTH QUESTIONNAIRE - PHQ9: CLINICAL INTERPRETATION OF PHQ2 SCORE: 0

## 2023-11-02 NOTE — PROGRESS NOTES
Chief Complaint   Patient presents with    Medicare Annual Wellness       HPI:  Amadeo Randall is a 68 y.o. here for Medicare Annual Wellness Visit     Patient Active Problem List    Diagnosis Date Noted    Multiple osteochondroma 12/02/2020    Bone cyst of right humerus 10/20/2020    Skin lesion 06/11/2018    Dyslipidemia 02/27/2018    Benign prostatic hyperplasia without lower urinary tract symptoms 02/27/2018    S/P cervical spinal fusion 02/27/2018    History of total replacement of both hip joints 02/27/2018    CAROLIN on CPAP 02/27/2018    Benign colonic polyp 05/06/2014    Actinic keratosis 06/20/2011       Current Outpatient Medications   Medication Sig Dispense Refill    tamsulosin (FLOMAX) 0.4 MG capsule Take 1 Capsule by mouth 1/2 hour after breakfast. 90 Capsule 3    Omega-3 Fatty Acids (FISH OIL) 1000 MG Cap capsule 1,000 mg.      MAGNESIUM PO Take  by mouth.      Ascorbic Acid (VITAMIN C) 1000 MG Tab       atorvastatin (LIPITOR) 20 MG Tab Take 1 Tablet by mouth every day. 90 Tablet 3    diazePAM (VALIUM) 2 MG Tab diazepam 2 mg tablet   TAKE 1 TABLET BY MOUTH EVERY 6 HOURS AS NEEDED FOR ANXIETY FOR UP TO 90 DAYS      Turmeric 500 MG Cap Take 1,500 mg by mouth.      Saw Palmetto, Serenoa repens, (SAW PALMETTO PO) Take 1,000 mg by mouth. Takes 2 times daily      vitamin D (CHOLECALCIFEROL) 1000 Unit (25 mcg) Tab Take 5,000 Units by mouth every day. Takes 2 times daily      Multiple Vitamins-Minerals (ZINC PO) Take  by mouth.      valacyclovir (VALTREX) 1 GM Tab Take 1 Tab by mouth 2 times a day. TAKE 2 TABLETS BY MOUTH TWICE DAILY FOR 2 DAYS ,  USE  AT  THE  FIRST  SIGN  OF  SYMPTOMS 20 Tab 5    Coenzyme Q10 (CO Q-10) 300 MG Cap Take  by mouth.      aspirin (ASA) 81 MG Chew Tab chewable tablet Take 81 mg by mouth every day.      glucosamine Sulfate 500 MG Cap Take 500 mg by mouth every day.      ezetimibe (ZETIA) 10 MG Tab Take 1 Tablet by mouth every day. (Patient not taking: Reported on 11/2/2023)       ezetimibe (ZETIA) 10 MG Tab Take 1 Tablet by mouth every day. 90 Tablet 3     No current facility-administered medications for this visit.          Current supplements as per medication list.     Allergies: Pcn [penicillins], Tape, and Skin adhesives  [mecrylate]    Current social contact/activities: 0     He  reports that he has never smoked. He has never used smokeless tobacco. He reports current alcohol use. He reports that he does not use drugs.  Counseling given: Not Answered      ROS:    Gait: Uses no assistive device  Ostomy: No  Other tubes: No  Amputations: No  Chronic oxygen use: No  Last eye exam: 2021  Wears hearing aids: No   : Denies any urinary leakage during the last 6 months    Screening:    Depression Screening  Little interest or pleasure in doing things?  0 - not at all  Feeling down, depressed , or hopeless? 0 - not at all  Patient Health Questionnaire Score: 0     If depressive symptoms identified deferred to follow up visit unless specifically addressed in assessment and plan.    Interpretation of PHQ-9 Total Score   Score Severity   1-4 No Depression   5-9 Mild Depression   10-14 Moderate Depression   15-19 Moderately Severe Depression   20-27 Severe Depression    Screening for Cognitive Impairment  Do you or any of your friends or family members have any concern about your memory? No  Three Minute Recall (Banana, Sunrise, Chair) 3/3    Harlan clock face with all 12 numbers and set the hands to show 20 past 8.  Yes    Cognitive concerns identified deferred for follow up unless specifically addressed in assessment and plan.    Fall Risk Assessment  Has the patient had two or more falls in the last year or any fall with injury in the last year?  No    Safety Assessment  Do you always wear your seatbelt?  Yes  Any changes to home needed to function safely? No  Difficulty hearing.  No  Patient counseled about all safety risks that were identified.    Functional Assessment ADLs  Are there any  barriers preventing you from cooking for yourself or meeting nutritional needs?  No.    Are there any barriers preventing you from driving safely or obtaining transportation?  No.    Are there any barriers preventing you from using a telephone or calling for help?  No    Are there any barriers preventing you from shopping?  No.    Are there any barriers preventing you from taking care of your own finances?  No    Are there any barriers preventing you from managing your medications?  No    Are there any barriers preventing you from showering, bathing or dressing yourself? No    Are there any barriers preventing you from doing housework or laundry? No  Are there any barriers preventing you from using the toilet?No  Are you currently engaging in any exercise or physical activity?  Yes.      Self-Assessment of Health  What is your perception of your health? Good  Do you sleep more than six hours a night? Yes  In the past 7 days, how much did pain keep you from doing your normal work? None  Do you spend quality time with family or friends (virtually or in person)? Yes  Do you usually eat a heart healthy diet that constists of a variety of fruits, vegetables, whole grains and fiber? Yes  Do you eat foods high in fat and/or Fast Food more than three times per week? No    Advance Care Planning  Do you have an Advance Directive, Living Will, Durable Power of , or POLST? Yes          is not on file - instructed patient to bring in a copy to scan into their chart      Health Maintenance Summary            Overdue - Pneumococcal Vaccine: 65+ Years (1 - PCV) Overdue - never done      No completion history exists for this topic.              Overdue - COVID-19 Vaccine (2 - Pfizer series) Overdue since 4/7/2023 12/07/2022  Imm Admin: MODERNA BIVALENT BOOSTER SARS-COV-2 VACCINE (6+)    08/10/2022  Imm Admin: MODERNA SARS-COV-2 VACCINE (12+)    07/02/2021  Imm Admin: MODERNA SARS-COV-2 VACCINE (12+)               Overdue - Annual Wellness Visit (Every 366 Days) Overdue since 8/11/2023      08/10/2022  Initial Annual Wellness Visit - Includes PPPS ()              Overdue - Influenza Vaccine (1) Overdue since 9/1/2023 04/13/2023  Outside Immunization: Fluzone High-Dose Quad    10/06/2021  Imm Admin: Influenza Vaccine Quad Recombinant    10/23/2020  Imm Admin: Influenza Vaccine Quad Recombinant    10/25/2019  Imm Admin: Influenza Vaccine Quad Inj (Pf)    11/05/2018  Imm Admin: Influenza Vaccine Quad Inj (Pf)    Only the first 5 history entries have been loaded, but more history exists.              Colorectal Cancer Screening (Colonoscopy - Every 10 Years) Next due on 5/23/2024 05/23/2014  Colonoscopy (Done - [facility])              Zoster (Shingles) Vaccines (Series Information) Completed      10/25/2019  Imm Admin: Zoster Vaccine Recombinant (RZV) (SHINGRIX)    07/22/2019  Imm Admin: Zoster Vaccine Recombinant (RZV) (SHINGRIX)              Hepatitis C Screening  Tentatively Complete      10/14/2020  Hepatitis C Antibody component of HEP C VIRUS ANTIBODY              Hepatitis A Vaccine (Hep A) (Series Information) Aged Out      No completion history exists for this topic.              Hepatitis B Vaccine (Hep B) (Series Information) Aged Out      No completion history exists for this topic.              HPV Vaccines (Series Information) Aged Out      No completion history exists for this topic.              Polio Vaccine (Inactivated Polio) (Series Information) Aged Out      No completion history exists for this topic.              Meningococcal Immunization (Series Information) Aged Out      No completion history exists for this topic.              Discontinued - IMM DTaP/Tdap/Td Vaccine  Discontinued      07/22/2019  Imm Admin: Tdap Vaccine                    Patient Care Team:  Don Rose M.D. as PCP - General (Family Medicine)  PREFERRED (DME Supplier)        Social History     Tobacco Use     "Smoking status: Never    Smokeless tobacco: Never   Vaping Use    Vaping Use: Never used   Substance Use Topics    Alcohol use: Yes     Comment: occ    Drug use: No     Family History   Problem Relation Age of Onset    Alzheimer's Disease Mother         Passed 2012    Cancer Mother     Breast Cancer Mother     Sleep Apnea Brother     Stroke Father      He  has a past medical history of Back pain, Chickenpox, Hyperlipidemia, Kidney stone, Mumps, Painful joint, Ringing in ears, Sleep apnea, Tonsillitis, and Wears glasses.   Past Surgical History:   Procedure Laterality Date    HIP REPLACEMENT, TOTAL         Exam:   /68   Pulse 99   Temp 36.5 °C (97.7 °F)   Resp 14   Ht 1.664 m (5' 5.5\")   Wt 79.4 kg (175 lb)   SpO2 96%  Body mass index is 28.68 kg/m².    Hearing good.    Dentition good  Alert, oriented in no acute distress.  Eye contact is good, speech goal directed, affect calm    Assessment and Plan. The following treatment and monitoring plan is recommended:    There are no diagnoses linked to this encounter.    Services suggested: No services needed at this time  Health Care Screening: Age-appropriate preventive services recommended by USPTF and ACIP covered by Medicare were discussed today. Services ordered if indicated and agreed upon by the patient.  Referrals offered: Community-based lifestyle interventions to reduce health risks and promote self-management and wellness, fall prevention, nutrition, physical activity, tobacco-use cessation, weight loss, and mental health services as per orders if indicated.    Discussion today about general wellness and lifestyle habits:    Prevent falls and reduce trip hazards; Cautioned about securing or removing rugs.  Have a working fire alarm and carbon monoxide detector;   Engage in regular physical activity and social activities     Follow-up: No follow-ups on file.    "

## 2023-11-02 NOTE — ASSESSMENT & PLAN NOTE
Followed by dermatology  Hx of cryotherapy  Encouraged sunscreen and use of wide brimmed hats, long sleeved shirts when outdoors.

## 2023-11-02 NOTE — ASSESSMENT & PLAN NOTE
No acute changes, continues on cpap for CAROLIN, no oxygen requirements.   No daytime fatigue or sleepiness

## 2023-11-02 NOTE — ASSESSMENT & PLAN NOTE
History of benign colon polyp, no blood in stool and up to date on colon cancer screening.   Last colonoscopy:

## 2023-11-02 NOTE — ASSESSMENT & PLAN NOTE
Chronic condition, pt takes atorvastatin 20 mg  The 10-year ASCVD risk score (Isabel MUNIZ, et al., 2019) is: 14.3%  Recheck fasting lipid annually  Continue asa 81 mg

## 2023-11-02 NOTE — ASSESSMENT & PLAN NOTE
Chronic condition, followed by orthopedics.   He had a few steroid injections of knees which actually also helped his hip pain.

## 2023-11-02 NOTE — ASSESSMENT & PLAN NOTE
Continues on flomax and saw palmetto  No acute changes  Check psa annually   Latest Reference Range & Units 10/17/23 08:08   Prostatic Specific Antigen Tot 0.00 - 4.00 ng/mL 0.69

## 2023-11-02 NOTE — ASSESSMENT & PLAN NOTE
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   I advised rest, ice, fore arm brace.

## 2023-11-02 NOTE — ASSESSMENT & PLAN NOTE
No acute changes,   Takes diazepam occasionally for severe muscle spasms.   rx 30 tabs for 1 yr.   Advised not to take with alcohol and side effects reviewed.   He has tried muscle relaxants but side effects not tolerable.

## 2023-11-06 ENCOUNTER — TELEPHONE (OUTPATIENT)
Dept: MEDICAL GROUP | Facility: PHYSICIAN GROUP | Age: 68
End: 2023-11-06
Payer: COMMERCIAL

## 2023-11-06 NOTE — TELEPHONE ENCOUNTER
DOCUMENTATION OF PAR STATUS:    1. Name of Medication & Dose: Diazepam      2. Name of Prescription Coverage Company & phone #: Medicare/[employer]     3. Date Prior Auth Submitted: 11/6/23    4. What information was given to obtain insurance decision? Dx code,chart notes     5. Prior Auth Status? Approved     6. Patient Notified: N\A

## 2023-12-11 DIAGNOSIS — M85.621 BONE CYST OF RIGHT HUMERUS: ICD-10-CM

## 2023-12-11 DIAGNOSIS — Q78.6 MULTIPLE OSTEOCHONDROMA: ICD-10-CM

## 2023-12-20 ENCOUNTER — APPOINTMENT (OUTPATIENT)
Dept: RADIOLOGY | Facility: MEDICAL CENTER | Age: 68
End: 2023-12-20
Attending: FAMILY MEDICINE
Payer: MEDICARE

## 2024-01-14 NOTE — PROGRESS NOTES
CC: Annual visit for CAROLIN        HPI:  Amadeo Randall is a 68 y.o. [place] resident kindly referred by Don Rose M.D. and last seen by Dr. BALTAZAR on 3/24/2023 when compliance was acceptable and his average AHI normalized at 4.7 on CPAP 8-10 CWP.      The patient completed a PSG study, and a PSG titration study at [facility] in [place], [place] and the results indicated on PSG study from 11/27/13 that the patient has CAROLIN with an AHI of 21.2.  The study showed no prolonged hypoxia, but there was transient desaturations with the lowest oxygen saturation of 81%.  The patient spent 2.2% of sleep time with oxygen saturation less than 89% as compared to a normal of 90% or above.    The PSG titration study from 12/13/13 indicated improvement of the respiratory events and hypoxia with positive airway pressure and the best improvement has been noticed at a pressure of 7 cm of water which decreased respiratory events to 0.9/h.  The lowest oxygen saturation during the study well and 7 cm of water pressure was 95%.    Today, 1/15/2024, is compliance is 100% greater than 4 hours with an average duration of 8 hours and 41 minutes.  On APAP 8-10 CWP his average residual estimated apnea-hypopnea index is normal 3.2.  He has no significant leak whatsoever.    The patient reports improved symptoms using positive airway pressure.  Has experienced no significant problems with claustrophobia, nosebleeds, sore throat, dry eyes, mask fit, air leaks around the mask, pressure tolerance, excessive airway dryness, dry or runny nose, nasal congestion, facial irritation, aerophagia, or chest pain.     Past medical history significant for dyslipidemia, never smoker, BPH, status post cervical spinal fusion, status post bilateral hip replacements, and multiple osteochondroma.        Patient Active Problem List    Diagnosis Date Noted    Bilateral elbow joint pain 11/02/2023    Multiple osteochondroma 12/02/2020    Bone cyst of right  humerus 10/20/2020    Skin lesion 06/11/2018    Dyslipidemia 02/27/2018    Benign prostatic hyperplasia without lower urinary tract symptoms 02/27/2018    S/P cervical spinal fusion 02/27/2018    History of total replacement of both hip joints 02/27/2018    CAROLIN on CPAP 02/27/2018    Benign colonic polyp 05/06/2014    Actinic keratosis 06/20/2011       Past Medical History:   Diagnosis Date    Back pain     Chickenpox     Hyperlipidemia     Kidney stone     Mumps     Painful joint     Ringing in ears     Sleep apnea     Tonsillitis     Wears glasses         Past Surgical History:   Procedure Laterality Date    HIP REPLACEMENT, TOTAL         Family History   Problem Relation Age of Onset    Alzheimer's Disease Mother         Passed 2012    Cancer Mother     Breast Cancer Mother     Sleep Apnea Brother     Stroke Father        Social History     Socioeconomic History    Marital status:      Spouse name: Not on file    Number of children: Not on file    Years of education: Not on file    Highest education level: Some college, no degree   Occupational History    Not on file   Tobacco Use    Smoking status: Never    Smokeless tobacco: Never   Vaping Use    Vaping Use: Never used   Substance and Sexual Activity    Alcohol use: Yes     Comment: occ    Drug use: No    Sexual activity: Not on file   Other Topics Concern    Not on file   Social History Narrative    Not on file     Social Determinants of Health     Financial Resource Strain: Low Risk  (11/2/2023)    Overall Financial Resource Strain (CARDIA)     Difficulty of Paying Living Expenses: Not very hard   Food Insecurity: No Food Insecurity (11/2/2023)    Hunger Vital Sign     Worried About Running Out of Food in the Last Year: Never true     Ran Out of Food in the Last Year: Never true   Transportation Needs: No Transportation Needs (11/2/2023)    PRAPARE - Transportation     Lack of Transportation (Medical): No     Lack of Transportation (Non-Medical): No    Physical Activity: Unknown (11/2/2023)    Exercise Vital Sign     Days of Exercise per Week: Not on file     Minutes of Exercise per Session: Patient refused   Stress: No Stress Concern Present (11/2/2023)    [employer] [place] [employer] - Occupational Stress Questionnaire     Feeling of Stress : Not at all   Social Connections: Moderately Integrated (11/2/2023)    Social Connection and Isolation Panel [NHANES]     Frequency of Communication with Friends and Family: More than three times a week     Frequency of Social Gatherings with Friends and Family: More than three times a week     Attends Sabianism Services: Never     Active Member of Clubs or Organizations: Yes     Attends Club or Organization Meetings: More than 4 times per year     Marital Status:    Intimate Partner Violence: Not on file   Housing Stability: Low Risk  (11/2/2023)    Housing Stability Vital Sign     Unable to Pay for Housing in the Last Year: No     Number of Places Lived in the Last Year: 1     Unstable Housing in the Last Year: No       Current Outpatient Medications   Medication Sig Dispense Refill    valacyclovir (VALTREX) 1 GM Tab Take 1 Tablet by mouth 2 times a day. TAKE 2 TABLETS BY MOUTH TWICE DAILY FOR 2 DAYS ,  USE  AT  THE  FIRST  SIGN  OF  SYMPTOMS 20 Tablet 5    tamsulosin (FLOMAX) 0.4 MG capsule Take 1 Capsule by mouth 1/2 hour after breakfast. 90 Capsule 3    Omega-3 Fatty Acids (FISH OIL) 1000 MG Cap capsule 1,000 mg.      MAGNESIUM PO Take  by mouth.      Ascorbic Acid (VITAMIN C) 1000 MG Tab       atorvastatin (LIPITOR) 20 MG Tab Take 1 Tablet by mouth every day. 90 Tablet 3    Turmeric 500 MG Cap Take 1,500 mg by mouth.      Saw Palmetto, Serenoa repens, (SAW PALMETTO PO) Take 1,000 mg by mouth. Takes 2 times daily      Multiple Vitamins-Minerals (ZINC PO) Take  by mouth.      Coenzyme Q10 (CO Q-10) 300 MG Cap Take  by mouth.      aspirin (ASA) 81 MG Chew Tab chewable tablet Take 81 mg by mouth every  "day.      glucosamine Sulfate 500 MG Cap Take 500 mg by mouth every day.      vitamin D (CHOLECALCIFEROL) 1000 Unit (25 mcg) Tab Take 5,000 Units by mouth every day. Takes 2 times daily       No current facility-administered medications for this visit.    \"CURRENT RX\"    ALLERGIES: Pcn [penicillins], Tape, and Skin adhesives  [mecrylate]    ROS    Constitutional: Denies fever, chills, sweats,  weight loss, fatigue  Cardiovascular: Denies chest pain, tightness, palpitations, swelling in legs/feet, fainting, difficulty breathing when lying down but gets better when sitting up.   Respiratory: Denies shortness of breath, cough, sputum, wheezing, painful breathing, coughing up blood.   Sleep: per HPI  Gastrointestinal: Denies  difficulty swallowing, nausea, abdominal pain, diarrhea, constipation, heartburn.  Musculoskeletal: Denies painful joints, sore muscles, back pain.        PHYSICAL EXAM      /74 (BP Location: Left arm, Patient Position: Sitting, BP Cuff Size: Adult)   Pulse 67   Resp 14   Ht 1.676 m (5' 6\")   Wt 83 kg (183 lb)   SpO2 95%   BMI 29.54 kg/m²   Appearance: Well-nourished, well-developed, no acute distress  Eyes:  PERRLA, EOMI; glasses  ENMT: without change  Neck: Supple, trachea midline, no masses  Respiratory effort:  No intercostal retractions or use of accessory muscles  Lung auscultation:  No wheezes rhonchi rubs or rales  Cardiac: No murmurs, rubs, or gallops; regular rhythm, normal rate; no edema  Abdomen:  No tenderness, no organomegaly.  Musculoskeletal:  Grossly normal; gait and station normal; digits and nails normal  Skin:  No rashes, petechiae, cyanosis  Neurologic: without focal signs; oriented to person, time, place, and purpose; judgement intact  Psychiatric:  No depression, anxiety, agitation      Medical Decision Making       The medical record was reviewed in its entirety including the referral notes, records from primary care, consultants notes, hospital records, lab, " imaging, microbiology, immunology, and immunizations.  Care gaps identified and reviewed with the patient.    Diagnostic and titration nocturnal polysomnogram's, home sleep apnea tests, continuous nocturnal oximetry results, multiple sleep latency tests, and compliance reports reviewed.  1. CAROLIN on CPAP  - DME Mask and Supplies      PLAN:   Has been advised to continue the current APAP 8-10 CWP, clean equipment frequently, and get new mask and supplies as allowed by insurance and DME. Advised about potential problems including nasal obstruction/stuffiness, mask leak or discomfort , frequent awakenings, chill or dryness of the upper airway, noise, inconvenience, and lack of benefit. Recommend an earlier appointment, if significant treatment barriers develop.    The risks of untreated sleep apnea were discussed with the patient at length. Patients with CAROLIN are at increased risk of cardiovascular disease including systemic arterial hypertension, pulmonary arterial hypertension (transient or fixed), TIA, and an elevated risk of stroke, heart attack, sudden death, or arrhythmia between the hours of 11 PM and 6 AM. CAROLIN patients have an increased risk of motor vehicle accidents, type 2 diabetes, GERD, repetitive mechanical trauma to pharyngeal muscles with inflammation and denervation, frequent EEG arousals leading to nonrestorative sleep, excessive daytime sleepiness, fatigue, depression, poor pain control, irritability, and lower quality of life.  The patient was advised to avoid driving a motor vehicle when drowsy.    Positive airway pressure will favorably impact many of the adverse conditions and effects provoked by CAROLIN.    Have advised the patient to follow up with the appropriate healthcare practitioners for all other medical problems and issues.    Return in about 1 year (around 1/15/2025).    Total time 30 minutes

## 2024-01-15 ENCOUNTER — OFFICE VISIT (OUTPATIENT)
Dept: SLEEP MEDICINE | Facility: MEDICAL CENTER | Age: 69
End: 2024-01-15
Attending: INTERNAL MEDICINE
Payer: MEDICARE

## 2024-01-15 VITALS
RESPIRATION RATE: 14 BRPM | OXYGEN SATURATION: 95 % | HEART RATE: 67 BPM | HEIGHT: 66 IN | DIASTOLIC BLOOD PRESSURE: 74 MMHG | BODY MASS INDEX: 29.41 KG/M2 | SYSTOLIC BLOOD PRESSURE: 124 MMHG | WEIGHT: 183 LBS

## 2024-01-15 DIAGNOSIS — G47.33 OSA ON CPAP: ICD-10-CM

## 2024-01-15 PROCEDURE — 99213 OFFICE O/P EST LOW 20 MIN: CPT | Performed by: INTERNAL MEDICINE

## 2024-01-15 PROCEDURE — 3078F DIAST BP <80 MM HG: CPT | Performed by: INTERNAL MEDICINE

## 2024-01-15 PROCEDURE — 99214 OFFICE O/P EST MOD 30 MIN: CPT | Performed by: INTERNAL MEDICINE

## 2024-01-15 PROCEDURE — 3074F SYST BP LT 130 MM HG: CPT | Performed by: INTERNAL MEDICINE

## 2024-01-15 ASSESSMENT — FIBROSIS 4 INDEX: FIB4 SCORE: 1.47

## 2024-01-15 ASSESSMENT — PATIENT HEALTH QUESTIONNAIRE - PHQ9: CLINICAL INTERPRETATION OF PHQ2 SCORE: 0

## 2024-01-16 ENCOUNTER — APPOINTMENT (OUTPATIENT)
Dept: RADIOLOGY | Facility: MEDICAL CENTER | Age: 69
End: 2024-01-16
Attending: FAMILY MEDICINE
Payer: MEDICARE

## 2024-01-18 DIAGNOSIS — N20.0 KIDNEY STONE: ICD-10-CM

## 2024-03-11 ENCOUNTER — HOSPITAL ENCOUNTER (OUTPATIENT)
Facility: MEDICAL CENTER | Age: 69
End: 2024-03-11
Attending: UROLOGY
Payer: COMMERCIAL

## 2024-03-11 PROCEDURE — 82365 CALCULUS SPECTROSCOPY: CPT

## 2024-03-15 ENCOUNTER — HOSPITAL ENCOUNTER (OUTPATIENT)
Dept: LAB | Facility: MEDICAL CENTER | Age: 69
End: 2024-03-15
Attending: UROLOGY
Payer: MEDICARE

## 2024-03-15 LAB
ALBUMIN SERPL BCP-MCNC: 4.4 G/DL (ref 3.2–4.9)
ALBUMIN/GLOB SERPL: 1.8 G/DL
ALP SERPL-CCNC: 56 U/L (ref 30–99)
ALT SERPL-CCNC: 25 U/L (ref 2–50)
ANION GAP SERPL CALC-SCNC: 12 MMOL/L (ref 7–16)
AST SERPL-CCNC: 22 U/L (ref 12–45)
BILIRUB SERPL-MCNC: 0.9 MG/DL (ref 0.1–1.5)
BUN SERPL-MCNC: 19 MG/DL (ref 8–22)
CALCIUM ALBUM COR SERPL-MCNC: 8.8 MG/DL (ref 8.5–10.5)
CALCIUM SERPL-MCNC: 9.1 MG/DL (ref 8.5–10.5)
CHLORIDE SERPL-SCNC: 105 MMOL/L (ref 96–112)
CO2 SERPL-SCNC: 24 MMOL/L (ref 20–33)
CREAT SERPL-MCNC: 0.66 MG/DL (ref 0.5–1.4)
GFR SERPLBLD CREATININE-BSD FMLA CKD-EPI: 103 ML/MIN/1.73 M 2
GLOBULIN SER CALC-MCNC: 2.4 G/DL (ref 1.9–3.5)
GLUCOSE SERPL-MCNC: 103 MG/DL (ref 65–99)
POTASSIUM SERPL-SCNC: 4.5 MMOL/L (ref 3.6–5.5)
PROT SERPL-MCNC: 6.8 G/DL (ref 6–8.2)
PTH-INTACT SERPL-MCNC: 25.4 PG/ML (ref 14–72)
SODIUM SERPL-SCNC: 141 MMOL/L (ref 135–145)
URATE SERPL-MCNC: 5.9 MG/DL (ref 2.5–8.3)

## 2024-03-15 PROCEDURE — 36415 COLL VENOUS BLD VENIPUNCTURE: CPT

## 2024-03-15 PROCEDURE — 83970 ASSAY OF PARATHORMONE: CPT

## 2024-03-15 PROCEDURE — 80053 COMPREHEN METABOLIC PANEL: CPT

## 2024-03-15 PROCEDURE — 84550 ASSAY OF BLOOD/URIC ACID: CPT

## 2024-03-17 LAB
APPEARANCE STONE: NORMAL
COMPN STONE: NORMAL
SPECIMEN WT: 39 MG

## 2024-07-31 DIAGNOSIS — E78.5 DYSLIPIDEMIA: ICD-10-CM

## 2024-07-31 RX ORDER — ATORVASTATIN CALCIUM 20 MG/1
20 TABLET, FILM COATED ORAL DAILY
Qty: 90 TABLET | Refills: 3 | Status: SHIPPED | OUTPATIENT
Start: 2024-07-31

## 2024-10-02 ENCOUNTER — HOSPITAL ENCOUNTER (OUTPATIENT)
Dept: LAB | Facility: MEDICAL CENTER | Age: 69
End: 2024-10-02
Attending: FAMILY MEDICINE
Payer: MEDICARE

## 2024-10-02 DIAGNOSIS — R73.09 ELEVATED HEMOGLOBIN A1C: ICD-10-CM

## 2024-10-02 LAB
EST. AVERAGE GLUCOSE BLD GHB EST-MCNC: 120 MG/DL
HBA1C MFR BLD: 5.8 % (ref 4–5.6)

## 2024-10-02 PROCEDURE — 36415 COLL VENOUS BLD VENIPUNCTURE: CPT | Mod: GA

## 2024-10-02 PROCEDURE — 83036 HEMOGLOBIN GLYCOSYLATED A1C: CPT | Mod: GA

## 2024-12-09 ENCOUNTER — TELEPHONE (OUTPATIENT)
Dept: MEDICAL GROUP | Facility: PHYSICIAN GROUP | Age: 69
End: 2024-12-09
Payer: MEDICARE

## 2024-12-12 ENCOUNTER — APPOINTMENT (OUTPATIENT)
Dept: RADIOLOGY | Facility: IMAGING CENTER | Age: 69
End: 2024-12-12
Attending: NURSE PRACTITIONER
Payer: MEDICARE

## 2024-12-12 ENCOUNTER — OFFICE VISIT (OUTPATIENT)
Dept: URGENT CARE | Facility: PHYSICIAN GROUP | Age: 69
End: 2024-12-12
Payer: MEDICARE

## 2024-12-12 VITALS
TEMPERATURE: 98.2 F | DIASTOLIC BLOOD PRESSURE: 94 MMHG | RESPIRATION RATE: 14 BRPM | BODY MASS INDEX: 30.34 KG/M2 | WEIGHT: 188 LBS | OXYGEN SATURATION: 97 % | SYSTOLIC BLOOD PRESSURE: 142 MMHG | HEART RATE: 97 BPM

## 2024-12-12 DIAGNOSIS — M25.512 ACUTE PAIN OF LEFT SHOULDER: ICD-10-CM

## 2024-12-12 DIAGNOSIS — M25.532 LEFT WRIST PAIN: ICD-10-CM
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
## 2024-12-12 PROCEDURE — 3077F SYST BP >= 140 MM HG: CPT | Performed by: NURSE PRACTITIONER

## 2024-12-12 PROCEDURE — 99213 OFFICE O/P EST LOW 20 MIN: CPT | Performed by: NURSE PRACTITIONER

## 2024-12-12 PROCEDURE — 73030 X-RAY EXAM OF SHOULDER: CPT | Mod: TC,FY,LT | Performed by: NURSE PRACTITIONER

## 2024-12-12 PROCEDURE — 3080F DIAST BP >= 90 MM HG: CPT | Performed by: NURSE PRACTITIONER

## 2024-12-12 PROCEDURE — 73110 X-RAY EXAM OF WRIST: CPT | Mod: TC,FY,LT | Performed by: NURSE PRACTITIONER

## 2024-12-12 RX ORDER — TRAMADOL HYDROCHLORIDE 50 MG/1
TABLET ORAL
COMMUNITY
End: 2024-12-27

## 2024-12-12 RX ORDER — DIAZEPAM 2 MG/1
TABLET ORAL
COMMUNITY

## 2024-12-12 RX ORDER — POTASSIUM CITRATE 15 MEQ/1
TABLET, EXTENDED RELEASE ORAL
COMMUNITY
Start: 2024-12-03

## 2024-12-12 RX ORDER — ATORVASTATIN CALCIUM 20 MG/1
1 TABLET, FILM COATED ORAL DAILY
COMMUNITY

## 2024-12-12 RX ORDER — CEPHALEXIN 500 MG/1
CAPSULE ORAL
COMMUNITY
End: 2024-12-27

## 2024-12-12 ASSESSMENT — FIBROSIS 4 INDEX: FIB4 SCORE: 1.496

## 2024-12-12 NOTE — PROGRESS NOTES
Subjective:     Amadeo Randall is a 69 y.o. male who presents for Fall (L shoulder, L wrist from injury from last month )      Fall      Pt presents for evaluation of a new problem. Sandro is a very pleasant 69-year-old male presents to urgent care today after suffering a fall down a hill 2-1/2 weeks ago.  He states that he got out of his truck and his foot went into a hole causing him to fall flat on his face and rolled down a hill.  He has been suffering from left-sided wrist pain and left-sided shoulder pain since this injury.  He does use ibuprofen as needed.  He has had extensive injuries to his left upper extremity with surgical repair.  He denies any numbness or tingling that is out of the norm for him.  His range of motion remains intact however, it is difficult for him to lift or to apply pressure to left upper extremity.    ROS    PMH:   Past Medical History:   Diagnosis Date    Back pain     Chickenpox     Hyperlipidemia     Kidney stone     Mumps     Painful joint     Ringing in ears     Sleep apnea     Tonsillitis     Wears glasses      ALLERGIES:   Allergies   Allergen Reactions    Pcn [Penicillins] Rash     Rash    Tape Unspecified    Skin Adhesives  [Mecrylate]      SURGHX:   Past Surgical History:   Procedure Laterality Date    HIP REPLACEMENT, TOTAL       SOCHX:   Social History     Socioeconomic History    Marital status:     Highest education level: Some college, no degree   Tobacco Use    Smoking status: Never    Smokeless tobacco: Never   Vaping Use    Vaping status: Never Used   Substance and Sexual Activity    Alcohol use: Yes     Comment: occ    Drug use: No     Social Drivers of Health     Financial Resource Strain: Low Risk  (11/2/2023)    Overall Financial Resource Strain (CARDIA)     Difficulty of Paying Living Expenses: Not very hard    Received from [facility]    Food Insecurity    Received from [facility]    Transportation Needs    Physical Activity: Unknown (11/2/2023)    Exercise Vital Sign     Minutes of Exercise per Session: Patient declined   Stress: No Stress Concern Present (11/2/2023)    [employer] [place] [employer] - Occupational Stress Questionnaire     Feeling of Stress : Not at all    [facility]    Social Connections   Housing Stability: Low Risk  (11/2/2023)    Housing Stability Vital Sign     Unable to Pay for Housing in the Last Year: No     Number of Places Lived in the Last Year: 1     Unstable Housing in the Last Year: No     FH:   Family History   Problem Relation Age of Onset    Alzheimer's Disease Mother         Passed 2012    Cancer Mother     Breast Cancer Mother     Sleep Apnea Brother     Stroke Father          Objective:   BP (!) 142/94   Pulse 97   Temp 36.8 °C (98.2 °F) (Temporal)   Resp 14   Wt 85.3 kg (188 lb)   SpO2 97%   BMI 30.34 kg/m²     Physical Exam  Vitals and nursing note reviewed.   Constitutional:       General: He is not in acute distress.     Appearance: Normal appearance. He is normal weight. He is not ill-appearing or toxic-appearing.   HENT:      Head: Normocephalic.      Right Ear: External ear normal.      Left Ear: External ear normal.      Nose: Nose normal.      Mouth/Throat:      Mouth: Mucous membranes are moist.   Eyes:      General:         Right eye: No discharge.         Left eye: No discharge.      Extraocular Movements: Extraocular movements intact.      Conjunctiva/sclera: Conjunctivae normal.      Pupils: Pupils are equal, round, and reactive to light.   Pulmonary:      Effort: Pulmonary effort is normal.      Breath sounds: Normal breath sounds.   Abdominal:      General: Abdomen is flat.   Musculoskeletal:         General: Normal range of motion.      Cervical back: Normal range of motion and neck supple. No rigidity.      Comments: Left arm strength 4/5.  Range of motion is intact.  No active swelling or ecchymosis.  There is  tenderness with palpation to left shoulder and left wrist.  Deformity of left arm noted.   Lymphadenopathy:      Cervical: No cervical adenopathy.   Skin:     General: Skin is warm and dry.   Neurological:      General: No focal deficit present.      Mental Status: He is alert and oriented to person, place, and time. Mental status is at baseline.   Psychiatric:         Mood and Affect: Mood normal.         Behavior: Behavior normal.         Judgment: Judgment normal.       DX-SHOULDER 2+ LEFT    Result Date: 12/12/2024 12/12/2024 10:57 AM HISTORY/REASON FOR EXAM:  Pain/Deformity Following Trauma. Fall 2 weeks ago, LEFT shoulder pain. TECHNIQUE/EXAM DESCRIPTION AND NUMBER OF VIEWS:  3 views of the LEFT shoulder. COMPARISON: None FINDINGS: Clavicle is intact.  AC joint is preserved. Expansion of the proximal humerus likely indicating exostoses.  Visualized proximal humerus is intact and normally located. Visualized LEFT upper chest is unremarkable. Prior cervical fusion.     1.  No fracture or dislocation of LEFT shoulder. 2.  Expansion of the proximal humerus likely indicating multiple exostoses.    DX-WRIST-COMPLETE 3+ LEFT    Result Date: 12/12/2024 12/12/2024 10:57 AM HISTORY/REASON FOR EXAM:  Pain/Deformity Following Trauma; Fall down hill 11/23. Pt does have deformity of left arm due to previous fractures. TECHNIQUE/EXAM DESCRIPTION AND NUMBER OF VIEWS:  4 views of the LEFT wrist. COMPARISON: 1/14/2019 FINDINGS: Chronic deformity of the ulna are again seen with shortening. Deformity of the distal radius consistent with old trauma, with resection of distal ulna. Narrowing of radiocarpal joint. Carpal alignment is preserved. No fracture or dislocation. No focal soft tissue swelling.     1.  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 2.  Mild osteoarthritis. 3.  No fracture or dislocation.     Assessment/Plan:   Assessment    1. Left wrist pain  DX-WRIST-COMPLETE 3+ LEFT      2. Acute  pain of left shoulder  DX-SHOULDER 2+ LEFT    MR-SHOULDER-W/O LEFT      3. Fall, initial encounter  DX-WRIST-COMPLETE 3+ LEFT    DX-SHOULDER 2+ LEFT    MR-SHOULDER-W/O LEFT        X-ray results discussed with patient.  MRI of left shoulder ordered for further evaluation of tendon versus ligament injury.  Patient to continue with over-the-counter pain relievers, ice and rest as needed.  He is established with orthopedics at [facility] and I did encourage him to call and make follow-up appointment.  He is in agreement with this plan of care.

## 2024-12-14 ENCOUNTER — HOSPITAL ENCOUNTER (OUTPATIENT)
Dept: RADIOLOGY | Facility: MEDICAL CENTER | Age: 69
End: 2024-12-14
Attending: NURSE PRACTITIONER
Payer: MEDICARE
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
## 2024-12-14 DIAGNOSIS — M25.512 ACUTE PAIN OF LEFT SHOULDER: ICD-10-CM

## 2024-12-14 PROCEDURE — 73221 MRI JOINT UPR EXTREM W/O DYE: CPT | Mod: LT

## 2024-12-27 DIAGNOSIS — R79.89 ABNORMAL CBC: ICD-10-CM

## 2024-12-27 DIAGNOSIS — E78.5 DYSLIPIDEMIA: ICD-10-CM

## 2024-12-27 DIAGNOSIS — R73.01 ELEVATED FASTING GLUCOSE: ICD-10-CM

## 2024-12-27 DIAGNOSIS — Z12.5 SCREENING FOR MALIGNANT NEOPLASM OF PROSTATE: ICD-10-CM

## 2025-01-02 ENCOUNTER — HOSPITAL ENCOUNTER (OUTPATIENT)
Dept: LAB | Facility: MEDICAL CENTER | Age: 70
End: 2025-01-02
Attending: FAMILY MEDICINE
Payer: MEDICARE

## 2025-01-02 DIAGNOSIS — R79.89 ABNORMAL CBC: ICD-10-CM

## 2025-01-02 DIAGNOSIS — Z12.5 SCREENING FOR MALIGNANT NEOPLASM OF PROSTATE: ICD-10-CM

## 2025-01-02 DIAGNOSIS — R73.01 ELEVATED FASTING GLUCOSE: ICD-10-CM

## 2025-01-02 DIAGNOSIS — E78.5 DYSLIPIDEMIA: ICD-10-CM

## 2025-01-02 LAB
BASOPHILS # BLD AUTO: 0.2 % (ref 0–1.8)
BASOPHILS # BLD: 0.01 K/UL (ref 0–0.12)
EOSINOPHIL # BLD AUTO: 0.07 K/UL (ref 0–0.51)
EOSINOPHIL NFR BLD: 1.2 % (ref 0–6.9)
ERYTHROCYTE [DISTWIDTH] IN BLOOD BY AUTOMATED COUNT: 43.7 FL (ref 35.9–50)
EST. AVERAGE GLUCOSE BLD GHB EST-MCNC: 111 MG/DL
HBA1C MFR BLD: 5.5 % (ref 4–5.6)
HCT VFR BLD AUTO: 47.9 % (ref 42–52)
HGB BLD-MCNC: 16 G/DL (ref 14–18)
IMM GRANULOCYTES # BLD AUTO: 0.03 K/UL (ref 0–0.11)
IMM GRANULOCYTES NFR BLD AUTO: 0.5 % (ref 0–0.9)
LYMPHOCYTES # BLD AUTO: 1.27 K/UL (ref 1–4.8)
LYMPHOCYTES NFR BLD: 21.2 % (ref 22–41)
MCH RBC QN AUTO: 30.5 PG (ref 27–33)
MCHC RBC AUTO-ENTMCNC: 33.4 G/DL (ref 32.3–36.5)
MCV RBC AUTO: 91.2 FL (ref 81.4–97.8)
MONOCYTES # BLD AUTO: 0.43 K/UL (ref 0–0.85)
MONOCYTES NFR BLD AUTO: 7.2 % (ref 0–13.4)
NEUTROPHILS # BLD AUTO: 4.18 K/UL (ref 1.82–7.42)
NEUTROPHILS NFR BLD: 69.7 % (ref 44–72)
NRBC # BLD AUTO: 0 K/UL
NRBC BLD-RTO: 0 /100 WBC (ref 0–0.2)
PLATELET # BLD AUTO: 210 K/UL (ref 164–446)
PMV BLD AUTO: 10.4 FL (ref 9–12.9)
RBC # BLD AUTO: 5.25 M/UL (ref 4.7–6.1)
WBC # BLD AUTO: 6 K/UL (ref 4.8–10.8)

## 2025-01-02 PROCEDURE — 85025 COMPLETE CBC W/AUTO DIFF WBC: CPT

## 2025-01-02 PROCEDURE — 80061 LIPID PANEL: CPT

## 2025-01-02 PROCEDURE — 80053 COMPREHEN METABOLIC PANEL: CPT

## 2025-01-02 PROCEDURE — 83036 HEMOGLOBIN GLYCOSYLATED A1C: CPT | Mod: GA

## 2025-01-02 PROCEDURE — 84153 ASSAY OF PSA TOTAL: CPT | Mod: GA

## 2025-01-02 PROCEDURE — 36415 COLL VENOUS BLD VENIPUNCTURE: CPT

## 2025-01-03 LAB
ALBUMIN SERPL BCP-MCNC: 4.3 G/DL (ref 3.2–4.9)
ALBUMIN/GLOB SERPL: 1.5 G/DL
ALP SERPL-CCNC: 60 U/L (ref 30–99)
ALT SERPL-CCNC: 25 U/L (ref 2–50)
ANION GAP SERPL CALC-SCNC: 9 MMOL/L (ref 7–16)
AST SERPL-CCNC: 23 U/L (ref 12–45)
BILIRUB SERPL-MCNC: 0.7 MG/DL (ref 0.1–1.5)
BUN SERPL-MCNC: 17 MG/DL (ref 8–22)
CALCIUM ALBUM COR SERPL-MCNC: 8.9 MG/DL (ref 8.5–10.5)
CALCIUM SERPL-MCNC: 9.1 MG/DL (ref 8.5–10.5)
CHLORIDE SERPL-SCNC: 105 MMOL/L (ref 96–112)
CHOLEST SERPL-MCNC: 206 MG/DL (ref 100–199)
CO2 SERPL-SCNC: 26 MMOL/L (ref 20–33)
CREAT SERPL-MCNC: 0.9 MG/DL (ref 0.5–1.4)
FASTING STATUS PATIENT QL REPORTED: NORMAL
GFR SERPLBLD CREATININE-BSD FMLA CKD-EPI: 93 ML/MIN/1.73 M 2
GLOBULIN SER CALC-MCNC: 2.8 G/DL (ref 1.9–3.5)
GLUCOSE SERPL-MCNC: 103 MG/DL (ref 65–99)
HDLC SERPL-MCNC: 46 MG/DL
LDLC SERPL CALC-MCNC: 114 MG/DL
POTASSIUM SERPL-SCNC: 4.5 MMOL/L (ref 3.6–5.5)
PROT SERPL-MCNC: 7.1 G/DL (ref 6–8.2)
PSA SERPL DL<=0.01 NG/ML-MCNC: 1 NG/ML (ref 0–4)
SODIUM SERPL-SCNC: 140 MMOL/L (ref 135–145)
TRIGL SERPL-MCNC: 230 MG/DL (ref 0–149)

## 2025-01-14 SDOH — HEALTH STABILITY: PHYSICAL HEALTH: ON AVERAGE, HOW MANY MINUTES DO YOU ENGAGE IN EXERCISE AT THIS LEVEL?: 30 MIN

## 2025-01-14 SDOH — ECONOMIC STABILITY: FOOD INSECURITY: WITHIN THE PAST 12 MONTHS, YOU WORRIED THAT YOUR FOOD WOULD RUN OUT BEFORE YOU GOT MONEY TO BUY MORE.: NEVER TRUE

## 2025-01-14 SDOH — ECONOMIC STABILITY: INCOME INSECURITY: HOW HARD IS IT FOR YOU TO PAY FOR THE VERY BASICS LIKE FOOD, HOUSING, MEDICAL CARE, AND HEATING?: NOT VERY HARD

## 2025-01-14 SDOH — HEALTH STABILITY: PHYSICAL HEALTH: ON AVERAGE, HOW MANY DAYS PER WEEK DO YOU ENGAGE IN MODERATE TO STRENUOUS EXERCISE (LIKE A BRISK WALK)?: 5 DAYS

## 2025-01-14 SDOH — ECONOMIC STABILITY: INCOME INSECURITY: IN THE LAST 12 MONTHS, WAS THERE A TIME WHEN YOU WERE NOT ABLE TO PAY THE MORTGAGE OR RENT ON TIME?: NO

## 2025-01-14 SDOH — ECONOMIC STABILITY: FOOD INSECURITY: WITHIN THE PAST 12 MONTHS, THE FOOD YOU BOUGHT JUST DIDN'T LAST AND YOU DIDN'T HAVE MONEY TO GET MORE.: NEVER TRUE

## 2025-01-14 ASSESSMENT — SOCIAL DETERMINANTS OF HEALTH (SDOH)
HOW OFTEN DO YOU ATTENT MEETINGS OF THE CLUB OR ORGANIZATION YOU BELONG TO?: MORE THAN 4 TIMES PER YEAR
HOW HARD IS IT FOR YOU TO PAY FOR THE VERY BASICS LIKE FOOD, HOUSING, MEDICAL CARE, AND HEATING?: NOT VERY HARD
HOW OFTEN DO YOU ATTENT MEETINGS OF THE CLUB OR ORGANIZATION YOU BELONG TO?: MORE THAN 4 TIMES PER YEAR
HOW OFTEN DO YOU HAVE SIX OR MORE DRINKS ON ONE OCCASION: NEVER
HOW OFTEN DO YOU ATTEND CHURCH OR RELIGIOUS SERVICES?: NEVER
HOW OFTEN DO YOU ATTEND CHURCH OR RELIGIOUS SERVICES?: NEVER
IN A TYPICAL WEEK, HOW MANY TIMES DO YOU TALK ON THE PHONE WITH FAMILY, FRIENDS, OR NEIGHBORS?: MORE THAN THREE TIMES A WEEK
IN THE PAST 12 MONTHS, HAS THE ELECTRIC, GAS, OIL, OR WATER COMPANY THREATENED TO SHUT OFF SERVICE IN YOUR HOME?: NO
IN A TYPICAL WEEK, HOW MANY TIMES DO YOU TALK ON THE PHONE WITH FAMILY, FRIENDS, OR NEIGHBORS?: MORE THAN THREE TIMES A WEEK
HOW OFTEN DO YOU GET TOGETHER WITH FRIENDS OR RELATIVES?: THREE TIMES A WEEK
DO YOU BELONG TO ANY CLUBS OR ORGANIZATIONS SUCH AS CHURCH GROUPS UNIONS, FRATERNAL OR ATHLETIC GROUPS, OR SCHOOL GROUPS?: YES
HOW OFTEN DO YOU GET TOGETHER WITH FRIENDS OR RELATIVES?: THREE TIMES A WEEK
HOW MANY DRINKS CONTAINING ALCOHOL DO YOU HAVE ON A TYPICAL DAY WHEN YOU ARE DRINKING: 1 OR 2
HOW OFTEN DO YOU HAVE A DRINK CONTAINING ALCOHOL: 2-4 TIMES A MONTH
WITHIN THE PAST 12 MONTHS, YOU WORRIED THAT YOUR FOOD WOULD RUN OUT BEFORE YOU GOT THE MONEY TO BUY MORE: NEVER TRUE
DO YOU BELONG TO ANY CLUBS OR ORGANIZATIONS SUCH AS CHURCH GROUPS UNIONS, FRATERNAL OR ATHLETIC GROUPS, OR SCHOOL GROUPS?: YES

## 2025-01-14 ASSESSMENT — LIFESTYLE VARIABLES
HOW MANY STANDARD DRINKS CONTAINING ALCOHOL DO YOU HAVE ON A TYPICAL DAY: 1 OR 2
HOW OFTEN DO YOU HAVE A DRINK CONTAINING ALCOHOL: 2-4 TIMES A MONTH
AUDIT-C TOTAL SCORE: 2
HOW OFTEN DO YOU HAVE SIX OR MORE DRINKS ON ONE OCCASION: NEVER
SKIP TO QUESTIONS 9-10: 1

## 2025-01-15 ENCOUNTER — OFFICE VISIT (OUTPATIENT)
Dept: MEDICAL GROUP | Facility: PHYSICIAN GROUP | Age: 70
End: 2025-01-15
Payer: MEDICARE

## 2025-01-15 VITALS
OXYGEN SATURATION: 98 % | DIASTOLIC BLOOD PRESSURE: 82 MMHG | HEIGHT: 66 IN | SYSTOLIC BLOOD PRESSURE: 136 MMHG | RESPIRATION RATE: 19 BRPM | BODY MASS INDEX: 30.22 KG/M2 | HEART RATE: 81 BPM | TEMPERATURE: 97.4 F | WEIGHT: 188 LBS

## 2025-01-15 DIAGNOSIS — Z96.643 HISTORY OF TOTAL REPLACEMENT OF BOTH HIP JOINTS: ICD-10-CM

## 2025-01-15 DIAGNOSIS — R73.01 ELEVATED FASTING GLUCOSE: ICD-10-CM

## 2025-01-15 DIAGNOSIS — N40.0 ENLARGED PROSTATE: ICD-10-CM

## 2025-01-15 DIAGNOSIS — Z98.1 S/P CERVICAL SPINAL FUSION: ICD-10-CM

## 2025-01-15 DIAGNOSIS — Q78.6 MULTIPLE OSTEOCHONDROMA: ICD-10-CM

## 2025-01-15 DIAGNOSIS — F41.9 ANXIETY: ICD-10-CM

## 2025-01-15 DIAGNOSIS — N40.0 BENIGN PROSTATIC HYPERPLASIA WITHOUT LOWER URINARY TRACT SYMPTOMS: ICD-10-CM

## 2025-01-15 DIAGNOSIS — E78.5 DYSLIPIDEMIA: ICD-10-CM

## 2025-01-15 DIAGNOSIS — K63.5 BENIGN COLONIC POLYP: ICD-10-CM

## 2025-01-15 DIAGNOSIS — L57.0 ACTINIC KERATOSIS: ICD-10-CM

## 2025-01-15 DIAGNOSIS — B00.1 COLD SORE: ICD-10-CM

## 2025-01-15 DIAGNOSIS — M85.621 BONE CYST OF RIGHT HUMERUS: ICD-10-CM

## 2025-01-15 PROBLEM — L98.9 SKIN LESION: Status: RESOLVED | Noted: 2018-06-11 | Resolved: 2025-01-15

## 2025-01-15 PROCEDURE — 3075F SYST BP GE 130 - 139MM HG: CPT | Performed by: FAMILY MEDICINE

## 2025-01-15 PROCEDURE — 3079F DIAST BP 80-89 MM HG: CPT | Performed by: FAMILY MEDICINE

## 2025-01-15 PROCEDURE — G0439 PPPS, SUBSEQ VISIT: HCPCS | Performed by: FAMILY MEDICINE

## 2025-01-15 ASSESSMENT — FIBROSIS 4 INDEX: FIB4 SCORE: 1.49

## 2025-01-15 ASSESSMENT — ACTIVITIES OF DAILY LIVING (ADL): BATHING_REQUIRES_ASSISTANCE: 0

## 2025-01-15 ASSESSMENT — PATIENT HEALTH QUESTIONNAIRE - PHQ9: CLINICAL INTERPRETATION OF PHQ2 SCORE: 0

## 2025-01-15 ASSESSMENT — ENCOUNTER SYMPTOMS: GENERAL WELL-BEING: GOOD

## 2025-01-15 NOTE — ASSESSMENT & PLAN NOTE
Ct cardiac ordered   For now continue atorvastatin 20 and aspirin as ascvd risk score is elevated  The 10-year ASCVD risk score (Isabel MUNIZ, et al., 2019) is: 18.4%

## 2025-01-15 NOTE — ASSESSMENT & PLAN NOTE
Chronic condition, followed by orthopedics.   He had a few steroid injections of knees which actually also helped his hip pain.   Had flare ups of tennis elbow.

## 2025-01-15 NOTE — PROGRESS NOTES
Chief Complaint   Patient presents with    Medicare Annual Wellness     Medicare wellness        HPI:  Amadeo Randall is a 69 y.o. here for Medicare Annual Wellness Visit     Patient Active Problem List    Diagnosis Date Noted    Bilateral elbow joint pain 11/02/2023    Multiple osteochondroma 12/02/2020    Bone cyst of right humerus 10/20/2020    Dyslipidemia 02/27/2018    Benign prostatic hyperplasia without lower urinary tract symptoms 02/27/2018    S/P cervical spinal fusion 02/27/2018    History of total replacement of both hip joints 02/27/2018    CAROLIN on CPAP 02/27/2018    Benign colonic polyp 05/06/2014    Actinic keratosis 06/20/2011       Current Outpatient Medications   Medication Sig Dispense Refill    valacyclovir (VALTREX) 1 GM Tab Take 1 Tablet by mouth 2 times a day. TAKE 2 TABLETS BY MOUTH TWICE DAILY FOR 2 DAYS ,  USE  AT  THE  FIRST  SIGN  OF  SYMPTOMS 20 Tablet 5    diazePAM (VALIUM) 2 MG Tab Take 1 Tablet by mouth 1 time a day as needed for Anxiety for up to 90 days. 30 Tablet 3    tamsulosin (FLOMAX) 0.4 MG capsule Take 1 Capsule by mouth 1/2 hour after breakfast. 90 Capsule 3    atorvastatin (LIPITOR) 20 MG Tab Take 1 Tablet by mouth every day. 100 Tablet 3    Potassium Citrate 15 MEQ (1620 MG) Tab CR Take 1 tablet twice a day by oral route as directed for 90 days.      Zinc Sulfate (ZINC 15 PO)       Omega-3 Fatty Acids (FISH OIL) 1000 MG Cap capsule 1,000 mg.      MAGNESIUM PO Take  by mouth.      Turmeric 500 MG Cap Take 1,500 mg by mouth.      Saw Palmetto, Serenoa repens, (SAW PALMETTO PO) Take 1,000 mg by mouth. Takes 2 times daily      vitamin D (CHOLECALCIFEROL) 1000 Unit (25 mcg) Tab Take 5,000 Units by mouth every day. Takes 2 times daily      Multiple Vitamins-Minerals (ZINC PO) Take  by mouth.      Coenzyme Q10 (CO Q-10) 300 MG Cap Take  by mouth.      aspirin (ASA) 81 MG Chew Tab chewable tablet Take 81 mg by mouth every day.      glucosamine Sulfate 500 MG Cap Take 500 mg by  mouth every day.       No current facility-administered medications for this visit.          Current supplements as per medication list.     Allergies: Pcn [penicillins], Tape, and Skin adhesives  [mecrylate]    Current social contact/activities: yes     He  reports that he has never smoked. He has never used smokeless tobacco. He reports current alcohol use. He reports that he does not use drugs.  Counseling given: Not Answered      ROS:    Gait: Uses no assistive device  Ostomy: No  Other tubes: No  Amputations: No  Chronic oxygen use: No  Last eye exam: 2023  Wears hearing aids: No   : Denies any urinary leakage during the last 6 months    Screening:    Depression Screening  Little interest or pleasure in doing things?  0 - not at all  Feeling down, depressed , or hopeless? 0 - not at all  Patient Health Questionnaire Score: 0     If depressive symptoms identified deferred to follow up visit unless specifically addressed in assessment and plan.    Interpretation of PHQ-9 Total Score   Score Severity   1-4 No Depression   5-9 Mild Depression   10-14 Moderate Depression   15-19 Moderately Severe Depression   20-27 Severe Depression    Screening for Cognitive Impairment  Do you or any of your friends or family members have any concern about your memory? No  Three Minute Recall (Leader, Season, Table) 3/3    Harlan clock face with all 12 numbers and set the hands to show 10 minutes after 11.       Cognitive concerns identified deferred for follow up unless specifically addressed in assessment and plan.    Fall Risk Assessment  Has the patient had two or more falls in the last year or any fall with injury in the last year?  No    Safety Assessment  Do you always wear your seatbelt?  Yes  Any changes to home needed to function safely? No  Difficulty hearing.  No  Patient counseled about all safety risks that were identified.    Functional Assessment ADLs  Are there any barriers preventing you from cooking for yourself  or meeting nutritional needs?  No.    Are there any barriers preventing you from driving safely or obtaining transportation?  No.    Are there any barriers preventing you from using a telephone or calling for help?  No    Are there any barriers preventing you from shopping?  No.    Are there any barriers preventing you from taking care of your own finances?  No    Are there any barriers preventing you from managing your medications?  No    Are there any barriers preventing you from showering, bathing or dressing yourself? No    Are there any barriers preventing you from doing housework or laundry? No  Are there any barriers preventing you from using the toilet?No  Are you currently engaging in any exercise or physical activity?  Yes.      Self-Assessment of Health  What is your perception of your health? Good    Do you sleep more than six hours a night? Yes    In the past 7 days, how much did pain keep you from doing your normal work? None    Do you spend quality time with family or friends (virtually or in person)? Yes    Do you usually eat a heart healthy diet that constists of a variety of fruits, vegetables, whole grains and fiber? Yes    Do you eat foods high in fat and/or Fast Food more than three times per week? No    How concerned are you that your medical conditions are not being well managed? Not at all    Are you worried that in the next 2 months, you may not have stable housing that you own, rent, or stay in as part of a household? No      Advance Care Planning  Do you have an Advance Directive, Living Will, Durable Power of , or POLST? Yes    Living Will     is on file      Health Maintenance Summary            Overdue - Pneumococcal Vaccine: 65+ Years (1 of 1 - PCV) Never done      No completion history exists for this topic.              Overdue - COVID-19 Vaccine (3 - 2024-25 season) Overdue since 9/1/2024 12/07/2022  Imm Admin: MODERNA BIVALENT BOOSTER SARS-COV-2 VACCINE (6+)     08/10/2022  Imm Admin: MODERNA SARS-COV-2 VACCINE (12+)    07/02/2021  Imm Admin: MODERNA SARS-COV-2 VACCINE (12+)              Annual Wellness Visit (Yearly) Next due on 1/21/2026 01/21/2025  Subsequent Annual Wellness Visit - Includes PPPS ()    11/02/2023  Subsequent Annual Wellness Visit - Includes PPPS ()    08/10/2022  Initial Annual Wellness Visit - Includes PPPS ()              Colorectal Cancer Screening (Colonoscopy - Every 7 Years) Next due on 10/28/2031      10/28/2024  COLONOSCOPY RESULTS    10/10/2024  COLONOSCOPY RESULTS    05/23/2014  Colonoscopy (Done - [facility])              Zoster (Shingles) Vaccines (Series Information) Completed      10/25/2019  Imm Admin: Zoster Vaccine Recombinant (RZV) (SHINGRIX)    07/22/2019  Imm Admin: Zoster Vaccine Recombinant (RZV) (SHINGRIX)              Hepatitis C Screening  Tentatively Complete      10/14/2020  Hepatitis C Antibody component of HEP C VIRUS ANTIBODY              Influenza Vaccine (Series Information) Completed      12/12/2024  Outside Immunization: Influenza, High Dose    04/13/2023  Imm Admin: Influenza Vaccine Adult HD    10/06/2021  Imm Admin: Influenza Vaccine Quad Recombinant    10/23/2020  Imm Admin: Influenza Vaccine Quad Recombinant    10/25/2019  Imm Admin: Influenza Vaccine Quad Inj (Pf)    Only the first 5 history entries have been loaded, but more history exists.              Hepatitis A Vaccine (Hep A) (Series Information) Aged Out      No completion history exists for this topic.              Hepatitis B Vaccine (Hep B) (Series Information) Aged Out      No completion history exists for this topic.              HPV Vaccines (Series Information) Aged Out      No completion history exists for this topic.              Polio Vaccine (Inactivated Polio) (Series Information) Aged Out      No completion history exists for this topic.              Meningococcal Immunization (Series Information) Aged Out      No  "completion history exists for this topic.              Discontinued - IMM DTaP/Tdap/Td Vaccine  Discontinued      07/22/2019  Imm Admin: Tdap Vaccine                    Patient Care Team:  Don Rose M.D. as PCP - General (Family Medicine)  PREFERRED (DME Supplier)        Social History     Tobacco Use    Smoking status: Never    Smokeless tobacco: Never   Vaping Use    Vaping status: Never Used   Substance Use Topics    Alcohol use: Yes     Comment: occ    Drug use: No     Family History   Problem Relation Age of Onset    Alzheimer's Disease Mother         Passed 2012    Cancer Mother     Breast Cancer Mother     Sleep Apnea Brother     Stroke Father      He  has a past medical history of Back pain, Chickenpox, Hyperlipidemia, Kidney stone, Mumps, Painful joint, Ringing in ears, Sleep apnea, Tonsillitis, and Wears glasses.   Past Surgical History:   Procedure Laterality Date    HIP REPLACEMENT, TOTAL         Exam:   /82 (BP Location: Right arm, Patient Position: Sitting, BP Cuff Size: Adult)   Pulse 81   Temp 36.3 °C (97.4 °F) (Temporal)   Resp 19   Ht 1.676 m (5' 6\")   Wt 85.3 kg (188 lb)   SpO2 98%  Body mass index is 30.34 kg/m².    Hearing good.    Dentition good  Alert, oriented in no acute distress.  Eye contact is good, speech goal directed, affect calm    Assessment and Plan. The following treatment and monitoring plan is recommended:    1. Dyslipidemia  - Lipid Profile; Future  - CT-CARDIAC SCORING; Future  - Subsequent Annual Wellness Visit - Includes PPPS ()    2. Elevated fasting glucose  - HEMOGLOBIN A1C; Future  - Subsequent Annual Wellness Visit - Includes PPPS ()    3. Actinic keratosis  - Subsequent Annual Wellness Visit - Includes PPPS ()    4. Benign colonic polyp  - Subsequent Annual Wellness Visit - Includes PPPS ()    5. Benign prostatic hyperplasia without lower urinary tract symptoms  - Subsequent Annual Wellness Visit - Includes PPPS ()    6. Bone " cyst of right humerus  - Subsequent Annual Wellness Visit - Includes PPPS ()    7. History of total replacement of both hip joints  - Subsequent Annual Wellness Visit - Includes PPPS ()    8. Multiple osteochondroma  - Subsequent Annual Wellness Visit - Includes PPPS ()    9. S/P cervical spinal fusion  - Subsequent Annual Wellness Visit - Includes PPPS ()    10. Cold sore  - valacyclovir (VALTREX) 1 GM Tab; Take 1 Tablet by mouth 2 times a day. TAKE 2 TABLETS BY MOUTH TWICE DAILY FOR 2 DAYS ,  USE  AT  THE  FIRST  SIGN  OF  SYMPTOMS  Dispense: 20 Tablet; Refill: 5  - Subsequent Annual Wellness Visit - Includes PPPS ()    11. Enlarged prostate  - tamsulosin (FLOMAX) 0.4 MG capsule; Take 1 Capsule by mouth 1/2 hour after breakfast.  Dispense: 90 Capsule; Refill: 3  - Subsequent Annual Wellness Visit - Includes PPPS ()    12. Anxiety  - diazePAM (VALIUM) 2 MG Tab; Take 1 Tablet by mouth 1 time a day as needed for Anxiety for up to 90 days.  Dispense: 30 Tablet; Refill: 3  - Subsequent Annual Wellness Visit - Includes PPPS ()    Other orders  - atorvastatin (LIPITOR) 20 MG Tab; Take 1 Tablet by mouth every day.  Dispense: 100 Tablet; Refill: 3      Services suggested: No services needed at this time  Health Care Screening: Age-appropriate preventive services recommended by USPTF and ACIP covered by Medicare were discussed today. Services ordered if indicated and agreed upon by the patient.  Referrals offered: Community-based lifestyle interventions to reduce health risks and promote self-management and wellness, fall prevention, nutrition, physical activity, tobacco-use cessation, weight loss, and mental health services as per orders if indicated.    Discussion today about general wellness and lifestyle habits:    Prevent falls and reduce trip hazards; Cautioned about securing or removing rugs.  Have a working fire alarm and carbon monoxide detector;   Engage in regular physical  activity and social activities     Follow-up: Return in about 3 months (around 4/15/2025) for cryotherapy chest and shoulder Lab Review.

## 2025-01-15 NOTE — ASSESSMENT & PLAN NOTE
Normal psa  Continues on flomax and saw palmetto  No acute changes  Check psa annually   Latest Reference Range & Units 10/17/23 08:08   Prostatic Specific Antigen Tot 0.00 - 4.00 ng/mL 0.69

## 2025-01-16 ENCOUNTER — OFFICE VISIT (OUTPATIENT)
Dept: SLEEP MEDICINE | Facility: MEDICAL CENTER | Age: 70
End: 2025-01-16
Attending: NURSE PRACTITIONER
Payer: MEDICARE

## 2025-01-16 DIAGNOSIS — G47.33 OSA ON CPAP: ICD-10-CM

## 2025-01-16 PROCEDURE — 3074F SYST BP LT 130 MM HG: CPT | Performed by: NURSE PRACTITIONER

## 2025-01-16 PROCEDURE — 99214 OFFICE O/P EST MOD 30 MIN: CPT | Performed by: NURSE PRACTITIONER

## 2025-01-16 PROCEDURE — 99213 OFFICE O/P EST LOW 20 MIN: CPT | Performed by: NURSE PRACTITIONER

## 2025-01-16 PROCEDURE — 3078F DIAST BP <80 MM HG: CPT | Performed by: NURSE PRACTITIONER

## 2025-01-16 ASSESSMENT — FIBROSIS 4 INDEX: FIB4 SCORE: 1.49

## 2025-01-16 NOTE — PROGRESS NOTES
No chief complaint on file.      HPI:  Amadeo Randall is a 69 y.o. year old male here today for follow-up on CAROLIN. The patient completed a PSG study, and a PSG titration study at [facility] in [place], [place] and the results indicated on PSG study from 11/27/13 that the patient has CAROLIN with an AHI of 21.2.  The study showed no prolonged hypoxia, but there was transient desaturations with the lowest oxygen saturation of 81%.  The patient spent 2.2% of sleep time with oxygen saturation less than 89% as compared to a normal of 90% or above.    The PSG titration study from 12/13/13 indicated improvement of the respiratory events and hypoxia with positive airway pressure and the best improvement has been noticed at a pressure of 7 cm of water which decreased respiratory events to 0.9/h.  The lowest oxygen saturation during the study well and 7 cm of water pressure was 95%.    Currently using P30 i nasal pillows.  Denies any difficulty with mask fit or pressures.  Averages between 6 to 8 hours of sleep.  Denies any difficulty falling or staying asleep.  Notes improvement in sleep quality using CPAP.  Does have occasional fatigue but does not nap.  Denies any excessive sleepiness, morning headaches, palpitations, concentration or memory problems.    30-day compliance reviewed with patient shows 100% use with an average time of 8 hours and 17 minutes and a residual AHI of 4.3.  No evidence of mask.    ROS: As per HPI and otherwise negative if not stated.    Past Medical History:   Diagnosis Date    Back pain     Chickenpox     Hyperlipidemia     Kidney stone     Mumps     Painful joint     Ringing in ears     Sleep apnea     Tonsillitis     Wears glasses        Past Surgical History:   Procedure Laterality Date    HIP REPLACEMENT, TOTAL         Family History   Problem Relation Age of Onset    Alzheimer's Disease Mother         Passed 2012    Cancer Mother     Breast Cancer Mother     Sleep Apnea Brother      Stroke Father        Allergies as of 01/16/2025 - Reviewed 01/15/2025   Allergen Reaction Noted    Pcn [penicillins] Rash 01/22/2018    Tape Unspecified 06/15/2023    Skin adhesives  [mecrylate]  04/27/2021        Vitals:  There were no vitals taken for this visit.    Current medications as of today   Current Outpatient Medications   Medication Sig Dispense Refill    diazePAM (VALIUM) 2 MG Tab TAKE 1 TABLET BY MOUTH EVERY 6 HOURS AS NEEDED FOR ANXIETY OR MUSCLE SPASM FOR UP TO 30 DAYS      Potassium Citrate 15 MEQ (1620 MG) Tab CR Take 1 tablet twice a day by oral route as directed for 90 days.      atorvastatin (LIPITOR) 20 MG Tab Take 1 Tablet by mouth every day.      Zinc Sulfate (ZINC 15 PO)       valacyclovir (VALTREX) 1 GM Tab Take 1 Tablet by mouth 2 times a day. TAKE 2 TABLETS BY MOUTH TWICE DAILY FOR 2 DAYS ,  USE  AT  THE  FIRST  SIGN  OF  SYMPTOMS 20 Tablet 5    tamsulosin (FLOMAX) 0.4 MG capsule Take 1 Capsule by mouth 1/2 hour after breakfast. 90 Capsule 3    Omega-3 Fatty Acids (FISH OIL) 1000 MG Cap capsule 1,000 mg.      MAGNESIUM PO Take  by mouth.      Turmeric 500 MG Cap Take 1,500 mg by mouth.      Saw Palmetto, Serenoa repens, (SAW PALMETTO PO) Take 1,000 mg by mouth. Takes 2 times daily      vitamin D (CHOLECALCIFEROL) 1000 Unit (25 mcg) Tab Take 5,000 Units by mouth every day. Takes 2 times daily      Multiple Vitamins-Minerals (ZINC PO) Take  by mouth.      Coenzyme Q10 (CO Q-10) 300 MG Cap Take  by mouth.      aspirin (ASA) 81 MG Chew Tab chewable tablet Take 81 mg by mouth every day.      glucosamine Sulfate 500 MG Cap Take 500 mg by mouth every day.       No current facility-administered medications for this visit.         Physical Exam:   Gen:           Alert and oriented, No apparent distress. Mood and affect appropriate, normal interaction with examiner.  Eyes:          PERRL, EOM intact, sclere white, conjunctive moist.  Ears:          Not examined.   Hearing:     Grossly  intact.  Nose:          Normal, no lesions or deformities.  Dentition:    Good dentition.  Oropharynx:   Tongue normal, posterior pharynx without erythema or exudate.  Neck:        Supple, trachea midline, no masses.  Respiratory Effort: No intercostal retractions or use of accessory muscles.   Lung Auscultation:      Clear to auscultation bilaterally; no rales, rhonchi or wheezing.  CV:            Regular rate and rhythm. No murmurs, rubs or gallops.  Abd:           Not examined.   Lymphadenopathy: Not examined.  Gait and Station: Normal.  Digits and Nails: No clubbing, cyanosis, petechiae, or nodes.   Cranial Nerves: II-XII grossly intact.  Skin:        No rashes, lesions or ulcers noted.               Ext:           No cyanosis or edema.      Assessment:  1. CAROLIN on CPAP          Plan:  Using and benefiting from CPAP therapy.  Compliance shows adequate use and control of CAROLIN.  Order placed for mask and supplies which were good for 1 year.  Advise annual follow-up, but can be seen sooner if needed.    Please note that this dictation was created using voice recognition software. I have made every reasonable attempt to correct obvious errors, but it is possible there are errors of grammar and possibly content that I did not discover before finalizing the note.

## 2025-01-21 RX ORDER — ATORVASTATIN CALCIUM 20 MG/1
20 TABLET, FILM COATED ORAL DAILY
Qty: 100 TABLET | Refills: 3 | Status: SHIPPED | OUTPATIENT
Start: 2025-01-21

## 2025-01-21 RX ORDER — TAMSULOSIN HYDROCHLORIDE 0.4 MG/1
0.4 CAPSULE ORAL
Qty: 90 CAPSULE | Refills: 3 | Status: SHIPPED | OUTPATIENT
Start: 2025-01-21

## 2025-01-21 RX ORDER — VALACYCLOVIR HYDROCHLORIDE 1 G/1
1000 TABLET, FILM COATED ORAL 2 TIMES DAILY
Qty: 20 TABLET | Refills: 5 | Status: SHIPPED | OUTPATIENT
Start: 2025-01-21

## 2025-01-21 RX ORDER — DIAZEPAM 2 MG/1
2 TABLET ORAL
Qty: 30 TABLET | Refills: 3 | Status: SHIPPED | OUTPATIENT
Start: 2025-01-21 | End: 2025-04-21

## 2025-02-06 ENCOUNTER — HOSPITAL ENCOUNTER (OUTPATIENT)
Dept: RADIOLOGY | Facility: MEDICAL CENTER | Age: 70
End: 2025-02-06
Attending: FAMILY MEDICINE
Payer: COMMERCIAL

## 2025-02-06 DIAGNOSIS — E78.5 DYSLIPIDEMIA: ICD-10-CM

## 2025-02-06 PROCEDURE — 4410556 CT-CARDIAC SCORING (SELF PAY ONLY)

## 2025-02-12 ENCOUNTER — RESULTS FOLLOW-UP (OUTPATIENT)
Dept: MEDICAL GROUP | Facility: PHYSICIAN GROUP | Age: 70
End: 2025-02-12

## 2025-04-01 ENCOUNTER — PATIENT MESSAGE (OUTPATIENT)
Dept: MEDICAL GROUP | Facility: PHYSICIAN GROUP | Age: 70
End: 2025-04-01
Payer: MEDICARE

## 2025-04-04 DIAGNOSIS — R79.89 ABNORMAL CBC: ICD-10-CM

## 2025-04-04 DIAGNOSIS — E55.9 VITAMIN D DEFICIENCY: ICD-10-CM

## 2025-04-04 DIAGNOSIS — R73.01 ELEVATED FASTING GLUCOSE: ICD-10-CM

## 2025-04-04 DIAGNOSIS — R25.2 MUSCLE CRAMPS: ICD-10-CM

## 2025-04-04 DIAGNOSIS — E78.5 DYSLIPIDEMIA: ICD-10-CM

## 2025-04-07 ENCOUNTER — HOSPITAL ENCOUNTER (OUTPATIENT)
Dept: LAB | Facility: MEDICAL CENTER | Age: 70
End: 2025-04-07
Attending: FAMILY MEDICINE
Payer: MEDICARE

## 2025-04-07 DIAGNOSIS — R73.01 ELEVATED FASTING GLUCOSE: ICD-10-CM

## 2025-04-07 DIAGNOSIS — R79.89 ABNORMAL CBC: ICD-10-CM

## 2025-04-07 DIAGNOSIS — E78.5 DYSLIPIDEMIA: ICD-10-CM

## 2025-04-07 DIAGNOSIS — E55.9 VITAMIN D DEFICIENCY: ICD-10-CM

## 2025-04-07 DIAGNOSIS — R25.2 MUSCLE CRAMPS: ICD-10-CM

## 2025-04-07 LAB
BASOPHILS # BLD AUTO: 0.2 % (ref 0–1.8)
BASOPHILS # BLD: 0.01 K/UL (ref 0–0.12)
EOSINOPHIL # BLD AUTO: 0.07 K/UL (ref 0–0.51)
EOSINOPHIL NFR BLD: 1.4 % (ref 0–6.9)
ERYTHROCYTE [DISTWIDTH] IN BLOOD BY AUTOMATED COUNT: 47.2 FL (ref 35.9–50)
HCT VFR BLD AUTO: 45.5 % (ref 42–52)
HGB BLD-MCNC: 14.3 G/DL (ref 14–18)
IMM GRANULOCYTES # BLD AUTO: 0.01 K/UL (ref 0–0.11)
IMM GRANULOCYTES NFR BLD AUTO: 0.2 % (ref 0–0.9)
LYMPHOCYTES # BLD AUTO: 1.04 K/UL (ref 1–4.8)
LYMPHOCYTES NFR BLD: 20.4 % (ref 22–41)
MCH RBC QN AUTO: 29.5 PG (ref 27–33)
MCHC RBC AUTO-ENTMCNC: 31.4 G/DL (ref 32.3–36.5)
MCV RBC AUTO: 93.8 FL (ref 81.4–97.8)
MONOCYTES # BLD AUTO: 0.4 K/UL (ref 0–0.85)
MONOCYTES NFR BLD AUTO: 7.9 % (ref 0–13.4)
NEUTROPHILS # BLD AUTO: 3.56 K/UL (ref 1.82–7.42)
NEUTROPHILS NFR BLD: 69.9 % (ref 44–72)
NRBC # BLD AUTO: 0 K/UL
NRBC BLD-RTO: 0 /100 WBC (ref 0–0.2)
PLATELET # BLD AUTO: 251 K/UL (ref 164–446)
PMV BLD AUTO: 11.6 FL (ref 9–12.9)
RBC # BLD AUTO: 4.85 M/UL (ref 4.7–6.1)
WBC # BLD AUTO: 5.1 K/UL (ref 4.8–10.8)

## 2025-04-07 PROCEDURE — 80053 COMPREHEN METABOLIC PANEL: CPT

## 2025-04-07 PROCEDURE — 83735 ASSAY OF MAGNESIUM: CPT

## 2025-04-07 PROCEDURE — 36415 COLL VENOUS BLD VENIPUNCTURE: CPT

## 2025-04-07 PROCEDURE — 82306 VITAMIN D 25 HYDROXY: CPT

## 2025-04-07 PROCEDURE — 83090 ASSAY OF HOMOCYSTEINE: CPT

## 2025-04-07 PROCEDURE — 80061 LIPID PANEL: CPT

## 2025-04-07 PROCEDURE — 83036 HEMOGLOBIN GLYCOSYLATED A1C: CPT | Mod: GA

## 2025-04-07 PROCEDURE — 85025 COMPLETE CBC W/AUTO DIFF WBC: CPT

## 2025-04-08 LAB
25(OH)D3 SERPL-MCNC: 44 NG/ML (ref 30–100)
ALBUMIN SERPL BCP-MCNC: 4.2 G/DL (ref 3.2–4.9)
ALBUMIN/GLOB SERPL: 1.7 G/DL
ALP SERPL-CCNC: 53 U/L (ref 30–99)
ALT SERPL-CCNC: 33 U/L (ref 2–50)
ANION GAP SERPL CALC-SCNC: 8 MMOL/L (ref 7–16)
AST SERPL-CCNC: 26 U/L (ref 12–45)
BILIRUB SERPL-MCNC: 0.9 MG/DL (ref 0.1–1.5)
BUN SERPL-MCNC: 20 MG/DL (ref 8–22)
CALCIUM ALBUM COR SERPL-MCNC: 8.8 MG/DL (ref 8.5–10.5)
CALCIUM SERPL-MCNC: 9 MG/DL (ref 8.5–10.5)
CHLORIDE SERPL-SCNC: 105 MMOL/L (ref 96–112)
CHOLEST SERPL-MCNC: 142 MG/DL (ref 100–199)
CO2 SERPL-SCNC: 26 MMOL/L (ref 20–33)
CREAT SERPL-MCNC: 0.9 MG/DL (ref 0.5–1.4)
EST. AVERAGE GLUCOSE BLD GHB EST-MCNC: 100 MG/DL
FASTING STATUS PATIENT QL REPORTED: NORMAL
GFR SERPLBLD CREATININE-BSD FMLA CKD-EPI: 93 ML/MIN/1.73 M 2
GLOBULIN SER CALC-MCNC: 2.5 G/DL (ref 1.9–3.5)
GLUCOSE SERPL-MCNC: 101 MG/DL (ref 65–99)
HBA1C MFR BLD: 5.1 % (ref 4–5.6)
HCYS SERPL-SCNC: 8.7 UMOL/L
HDLC SERPL-MCNC: 39 MG/DL
LDLC SERPL CALC-MCNC: 85 MG/DL
MAGNESIUM SERPL-MCNC: 2.2 MG/DL (ref 1.5–2.5)
POTASSIUM SERPL-SCNC: 4.8 MMOL/L (ref 3.6–5.5)
PROT SERPL-MCNC: 6.7 G/DL (ref 6–8.2)
SODIUM SERPL-SCNC: 139 MMOL/L (ref 135–145)
TRIGL SERPL-MCNC: 91 MG/DL (ref 0–149)

## 2025-04-16 ENCOUNTER — RESULTS FOLLOW-UP (OUTPATIENT)
Dept: MEDICAL GROUP | Facility: PHYSICIAN GROUP | Age: 70
End: 2025-04-16

## 2025-06-24 ENCOUNTER — APPOINTMENT (OUTPATIENT)
Dept: MEDICAL GROUP | Facility: PHYSICIAN GROUP | Age: 70
End: 2025-06-24
Payer: MEDICARE

## 2025-06-24 VITALS
DIASTOLIC BLOOD PRESSURE: 70 MMHG | HEIGHT: 64 IN | OXYGEN SATURATION: 96 % | SYSTOLIC BLOOD PRESSURE: 126 MMHG | TEMPERATURE: 97.6 F | BODY MASS INDEX: 29.02 KG/M2 | HEART RATE: 71 BPM | RESPIRATION RATE: 16 BRPM | WEIGHT: 170 LBS

## 2025-06-24 DIAGNOSIS — M25.521 BILATERAL ELBOW JOINT PAIN: ICD-10-CM

## 2025-06-24 DIAGNOSIS — E78.5 DYSLIPIDEMIA: Primary | ICD-10-CM

## 2025-06-24 DIAGNOSIS — L82.0 SEBORRHEIC KERATOSES, INFLAMED: ICD-10-CM

## 2025-06-24 DIAGNOSIS — M25.522 BILATERAL ELBOW JOINT PAIN: ICD-10-CM

## 2025-06-24 RX ORDER — MELOXICAM 7.5 MG/1
7.5 TABLET ORAL
Qty: 90 TABLET | Refills: 1 | Status: SHIPPED | OUTPATIENT
Start: 2025-06-24

## 2025-06-24 ASSESSMENT — FIBROSIS 4 INDEX: FIB4 SCORE: 1.23

## 2025-06-24 NOTE — ASSESSMENT & PLAN NOTE
The 10-year ASCVD risk score (Isabel MUNIZ, et al., 2019) is: 14.3%  Discussed statin, asa  Pt would like to stop statin  Father had CAD, stroke  Pt has never smoked, prediabetes controlled with diet. Does not have HTN    He would like to talk to a vascular specialist referral done.   He is physically active and eats a healthy diet.

## 2025-06-24 NOTE — PROGRESS NOTES
"Subjective:   Amadeo Randall is a 69 y.o. male here today for evaluation and management of:     Dyslipidemia  The 10-year ASCVD risk score (Isabel DK, et al., 2019) is: 14.3%  Discussed statin, asa  Pt would like to stop statin  Father had CAD, stroke  Pt has never smoked, prediabetes controlled with diet. Does not have HTN    He would like to talk to a vascular specialist referral done.   He is physically active and eats a healthy diet.         Bilateral elbow joint pain  Had two steroid inj in elbows works for 6 months and then some activity flares it up  Feels pain every morning but after the shots now able to open a jar, requests refill of meloxicam.   Last shots were 6 months ago.   Advised on adverse effects of melixicam with asa. Advised to use ice.     Seborrheic keratoses, inflamed  1 cm seb k with inflammation, irritation due to clothing at left upper chest area.   Cryotherapy with pulse application provided x 2          Current medicines (including changes today)  Current Medications[1]  He  has a past medical history of Back pain, Chickenpox, Hyperlipidemia, Kidney stone, Mumps, Painful joint, Ringing in ears, Sleep apnea, Tonsillitis, and Wears glasses.    ROS  No chest pain, no shortness of breath, no abdominal pain       Objective:     /70 (BP Location: Left arm, Patient Position: Sitting, BP Cuff Size: Adult)   Pulse 71   Temp 36.4 °C (97.6 °F) (Temporal)   Resp 16   Ht 1.626 m (5' 4\")   Wt 77.1 kg (170 lb)   SpO2 96%  Body mass index is 29.18 kg/m².   Physical Exam:  Constitutional: Alert, no distress.  Skin: Warm, dry, good turgor, no rashes in visible areas.  Eye: Equal, round and reactive, conjunctiva clear, lids normal.  ENMT: Lips without lesions, good dentition, oropharynx clear.  Neck: Trachea midline, no masses, no thyromegaly. No cervical or supraclavicular lymphadenopathy  Respiratory: Unlabored respiratory effort, lungs clear to auscultation, no wheezes, no " jocelyne.  Cardiovascular: Normal S1, S2, no murmur, no edema.  Abdomen: Soft, non-tender, no masses, no hepatosplenomegaly.  Psych: Alert and oriented x3, normal affect and mood.    Assessment and Plan:   The following treatment plan was discussed    1. Dyslipidemia  - Referral to Vascular Medicine    2. Bilateral elbow joint pain    3. Seborrheic keratoses, inflamed    Other orders  - meloxicam (MOBIC) 7.5 MG Tab; Take 1 Tablet by mouth 1 time a day as needed for Moderate Pain.  Dispense: 90 Tablet; Refill: 1      Followup: Return in about 7 months (around 1/16/2026) for awv.                [1]   Current Outpatient Medications   Medication Sig Dispense Refill    meloxicam (MOBIC) 7.5 MG Tab Take 1 Tablet by mouth 1 time a day as needed for Moderate Pain. 90 Tablet 1    valacyclovir (VALTREX) 1 GM Tab Take 1 Tablet by mouth 2 times a day. TAKE 2 TABLETS BY MOUTH TWICE DAILY FOR 2 DAYS ,  USE  AT  THE  FIRST  SIGN  OF  SYMPTOMS 20 Tablet 5    atorvastatin (LIPITOR) 20 MG Tab Take 1 Tablet by mouth every day. 100 Tablet 3    Zinc Sulfate (ZINC 15 PO)       Omega-3 Fatty Acids (FISH OIL) 1000 MG Cap capsule 1,000 mg.      MAGNESIUM PO Take  by mouth.      Saw Palmetto, Serenoa repens, (SAW PALMETTO PO) Take 1,000 mg by mouth. Takes 2 times daily      vitamin D (CHOLECALCIFEROL) 1000 Unit (25 mcg) Tab Take 5,000 Units by mouth every day. Takes 2 times daily      Multiple Vitamins-Minerals (ZINC PO) Take  by mouth.      Coenzyme Q10 (CO Q-10) 300 MG Cap Take  by mouth.      aspirin (ASA) 81 MG Chew Tab chewable tablet Take 81 mg by mouth every day.      glucosamine Sulfate 500 MG Cap Take 500 mg by mouth every day.       No current facility-administered medications for this visit.

## 2025-06-24 NOTE — ASSESSMENT & PLAN NOTE
Had two steroid inj in elbows works for 6 months and then some activity flares it up  Feels pain every morning but after the shots now able to open a jar, requests refill of meloxicam.   Last shots were 6 months ago.   Advised on adverse effects of melixicam with asa. Advised to use ice.

## 2025-06-25 NOTE — ASSESSMENT & PLAN NOTE
1 cm seb k with inflammation, irritation due to clothing at left upper chest area.   Cryotherapy with pulse application provided x 2

## 2025-07-02 ENCOUNTER — TELEPHONE (OUTPATIENT)
Dept: HEALTH INFORMATION MANAGEMENT | Facility: OTHER | Age: 70
End: 2025-07-02
Payer: MEDICARE

## 2025-08-25 ENCOUNTER — TELEPHONE (OUTPATIENT)
Dept: VASCULAR LAB | Facility: MEDICAL CENTER | Age: 70
End: 2025-08-25
Payer: MEDICARE